# Patient Record
Sex: MALE | URBAN - METROPOLITAN AREA
[De-identification: names, ages, dates, MRNs, and addresses within clinical notes are randomized per-mention and may not be internally consistent; named-entity substitution may affect disease eponyms.]

---

## 2023-03-30 ENCOUNTER — TELEPHONE (OUTPATIENT)
Age: 78
End: 2023-03-30

## 2023-03-30 NOTE — TELEPHONE ENCOUNTER
----- Message from Nabila Cesar sent at 3/30/2023  9:50 AM EDT -----  Subject: Appointment Request    Reason for Call: New Patient/New to Provider Appointment needed: New   Patient Request Appointment    QUESTIONS    Reason for appointment request? No appointments available during search     Additional Information for Provider? Pt would like to know if John Powell would be willing to accept him as a new pt. Referred by a pt of   hers; José Miguel Malone;  Would like a call back  ---------------------------------------------------------------------------  --------------  [id number]!  [phone]; OK to leave message on voicemail  ---------------------------------------------------------------------------  --------------  SCRIPT ANSWERS  MEL Screen: Rae Meyer

## 2023-05-01 ENCOUNTER — OFFICE VISIT (OUTPATIENT)
Age: 78
End: 2023-05-01
Payer: COMMERCIAL

## 2023-05-01 ENCOUNTER — HOSPITAL ENCOUNTER (OUTPATIENT)
Facility: HOSPITAL | Age: 78
Discharge: HOME | End: 2023-05-04

## 2023-05-01 VITALS
OXYGEN SATURATION: 95 % | HEART RATE: 68 BPM | WEIGHT: 151.6 LBS | HEIGHT: 67 IN | DIASTOLIC BLOOD PRESSURE: 75 MMHG | RESPIRATION RATE: 16 BRPM | BODY MASS INDEX: 23.79 KG/M2 | SYSTOLIC BLOOD PRESSURE: 107 MMHG | TEMPERATURE: 98.3 F

## 2023-05-01 DIAGNOSIS — K59.09 CHRONIC CONSTIPATION: ICD-10-CM

## 2023-05-01 DIAGNOSIS — Z76.89 ENCOUNTER TO ESTABLISH CARE: Primary | ICD-10-CM

## 2023-05-01 DIAGNOSIS — R20.2 NUMBNESS AND TINGLING OF BOTH UPPER EXTREMITIES: ICD-10-CM

## 2023-05-01 DIAGNOSIS — E78.00 HYPERCHOLESTEREMIA: ICD-10-CM

## 2023-05-01 DIAGNOSIS — Z87.891 FORMER SMOKER: ICD-10-CM

## 2023-05-01 DIAGNOSIS — R10.9 CHRONIC ABDOMINAL PAIN: ICD-10-CM

## 2023-05-01 DIAGNOSIS — K21.9 GASTROESOPHAGEAL REFLUX DISEASE WITHOUT ESOPHAGITIS: ICD-10-CM

## 2023-05-01 DIAGNOSIS — G89.29 CHRONIC ABDOMINAL PAIN: ICD-10-CM

## 2023-05-01 DIAGNOSIS — N52.9 ERECTILE DYSFUNCTION, UNSPECIFIED ERECTILE DYSFUNCTION TYPE: ICD-10-CM

## 2023-05-01 DIAGNOSIS — J30.89 ENVIRONMENTAL AND SEASONAL ALLERGIES: ICD-10-CM

## 2023-05-01 DIAGNOSIS — E11.40 TYPE 2 DIABETES MELLITUS WITH DIABETIC NEUROPATHY, WITHOUT LONG-TERM CURRENT USE OF INSULIN (HCC): ICD-10-CM

## 2023-05-01 DIAGNOSIS — H26.9 CATARACT OF LEFT EYE, UNSPECIFIED CATARACT TYPE: ICD-10-CM

## 2023-05-01 DIAGNOSIS — M50.30 DEGENERATIVE DISC DISEASE, CERVICAL: ICD-10-CM

## 2023-05-01 DIAGNOSIS — D17.0 LIPOMA OF HEAD: ICD-10-CM

## 2023-05-01 DIAGNOSIS — Z79.1 LONG TERM CURRENT USE OF NON-STEROIDAL ANTI-INFLAMMATORIES (NSAID): ICD-10-CM

## 2023-05-01 DIAGNOSIS — R20.0 NUMBNESS AND TINGLING OF BOTH UPPER EXTREMITIES: ICD-10-CM

## 2023-05-01 DIAGNOSIS — I10 PRIMARY HYPERTENSION: ICD-10-CM

## 2023-05-01 DIAGNOSIS — E87.0 HYPERNATREMIA: ICD-10-CM

## 2023-05-01 LAB — LABCORP SPECIMEN COLLECTION: NORMAL

## 2023-05-01 PROCEDURE — 3074F SYST BP LT 130 MM HG: CPT | Performed by: NURSE PRACTITIONER

## 2023-05-01 PROCEDURE — 3044F HG A1C LEVEL LT 7.0%: CPT | Performed by: NURSE PRACTITIONER

## 2023-05-01 PROCEDURE — 3078F DIAST BP <80 MM HG: CPT | Performed by: NURSE PRACTITIONER

## 2023-05-01 PROCEDURE — 1123F ACP DISCUSS/DSCN MKR DOCD: CPT | Performed by: NURSE PRACTITIONER

## 2023-05-01 PROCEDURE — 99001 SPECIMEN HANDLING PT-LAB: CPT

## 2023-05-01 RX ORDER — HYDROCHLOROTHIAZIDE 25 MG/1
25 TABLET ORAL
COMMUNITY
Start: 2023-04-18

## 2023-05-01 RX ORDER — LISINOPRIL 40 MG/1
1 TABLET ORAL DAILY
Qty: 30 TABLET | Refills: 11 | COMMUNITY
Start: 2022-08-01 | End: 2023-08-02

## 2023-05-01 RX ORDER — MELOXICAM 15 MG/1
TABLET ORAL
COMMUNITY
Start: 2022-12-04 | End: 2023-12-06

## 2023-05-01 RX ORDER — ASPIRIN 81 MG/1
1 TABLET ORAL DAILY
COMMUNITY
Start: 2023-02-02

## 2023-05-01 RX ORDER — CETIRIZINE HYDROCHLORIDE 10 MG/1
1 TABLET ORAL DAILY
COMMUNITY
Start: 2022-12-01

## 2023-05-01 RX ORDER — LATANOPROST 50 UG/ML
SOLUTION/ DROPS OPHTHALMIC
COMMUNITY
Start: 2023-04-18

## 2023-05-01 RX ORDER — FLUTICASONE PROPIONATE 50 MCG
1 SPRAY, SUSPENSION (ML) NASAL DAILY
COMMUNITY

## 2023-05-01 RX ORDER — AMLODIPINE BESYLATE 5 MG/1
TABLET ORAL
COMMUNITY
Start: 2022-11-16 | End: 2023-08-02

## 2023-05-01 RX ORDER — ATORVASTATIN CALCIUM 20 MG/1
20 TABLET, FILM COATED ORAL
COMMUNITY
End: 2023-05-01

## 2023-05-01 RX ORDER — ALBUTEROL SULFATE 90 UG/1
AEROSOL, METERED RESPIRATORY (INHALATION)
COMMUNITY
Start: 2023-01-18

## 2023-05-01 RX ORDER — OMEPRAZOLE 20 MG/1
20 CAPSULE, DELAYED RELEASE ORAL
Qty: 90 CAPSULE | Refills: 1 | Status: SHIPPED | OUTPATIENT
Start: 2023-05-01

## 2023-05-01 RX ORDER — SILDENAFIL 100 MG/1
100 TABLET, FILM COATED ORAL
COMMUNITY
Start: 2023-02-02

## 2023-05-01 RX ORDER — TIMOLOL MALEATE 5 MG/ML
1 SOLUTION/ DROPS OPHTHALMIC 2 TIMES DAILY
COMMUNITY

## 2023-05-01 RX ORDER — ROSUVASTATIN CALCIUM 20 MG/1
10 TABLET, COATED ORAL DAILY
COMMUNITY

## 2023-05-01 RX ORDER — PSEUDOEPHEDRINE HCL 30 MG
TABLET ORAL
COMMUNITY
Start: 2022-12-01 | End: 2023-12-04

## 2023-05-01 SDOH — ECONOMIC STABILITY: INCOME INSECURITY: HOW HARD IS IT FOR YOU TO PAY FOR THE VERY BASICS LIKE FOOD, HOUSING, MEDICAL CARE, AND HEATING?: NOT HARD AT ALL

## 2023-05-01 SDOH — ECONOMIC STABILITY: FOOD INSECURITY: WITHIN THE PAST 12 MONTHS, YOU WORRIED THAT YOUR FOOD WOULD RUN OUT BEFORE YOU GOT MONEY TO BUY MORE.: NEVER TRUE

## 2023-05-01 SDOH — ECONOMIC STABILITY: FOOD INSECURITY: WITHIN THE PAST 12 MONTHS, THE FOOD YOU BOUGHT JUST DIDN'T LAST AND YOU DIDN'T HAVE MONEY TO GET MORE.: NEVER TRUE

## 2023-05-01 SDOH — ECONOMIC STABILITY: HOUSING INSECURITY
IN THE LAST 12 MONTHS, WAS THERE A TIME WHEN YOU DID NOT HAVE A STEADY PLACE TO SLEEP OR SLEPT IN A SHELTER (INCLUDING NOW)?: NO

## 2023-05-01 ASSESSMENT — PATIENT HEALTH QUESTIONNAIRE - PHQ9
SUM OF ALL RESPONSES TO PHQ9 QUESTIONS 1 & 2: 0
SUM OF ALL RESPONSES TO PHQ QUESTIONS 1-9: 0
1. LITTLE INTEREST OR PLEASURE IN DOING THINGS: 0
SUM OF ALL RESPONSES TO PHQ QUESTIONS 1-9: 0
2. FEELING DOWN, DEPRESSED OR HOPELESS: 0

## 2023-05-01 NOTE — PROGRESS NOTES
[facility] [place] [address] [address] [facility], [address]  [phone] ZQREXZ/[phone] fax    5/1/2023    Ryan Murrell 1945 is a pleasant BLACK male. Todays concern/HPI:  Establish care. Disabled . All medications obtained via [facility]. He also sees primary care via [facility]. ABD pain. Tried Bridget seltzer, pepto dismal. Effective sometimes. Spicy foods may cause sx. Numb/tingling. Down both arms. Started about 1.5 yrs ago. Interm. Arthritis. Shoulder and knees. Taking Mobic 15mg x15 years. [facility] Former smoker. Uses albuterol approx 1x/m. Cataract Left eye.  [facility].       Review of Systems - Negative except above    Past Medical History:   Diagnosis Date    Cataract of left eye 5/2/2023    Chronic abdominal pain 5/2/2023    Chronic constipation 5/2/2023    Environmental and seasonal allergies 5/2/2023    Former smoker 5/2/2023    Gastroesophageal reflux disease without esophagitis 5/2/2023    Hypercholesteremia 5/2/2023    Lipoma of head 5/2/2023    Long term current use of non-steroidal anti-inflammatories (NSAID) 5/2/2023    Male erectile dysfunction, unspecified 5/2/2023    Numbness and tingling of both upper extremities 5/2/2023    Primary hypertension 5/2/2023    Type 2 diabetes mellitus with diabetic neuropathy, without long-term current use of insulin (HCC) 5/2/2023     Current Outpatient Medications   Medication Sig    latanoprost (XALATAN) 0.005 % ophthalmic solution INSTILL 1 DROP IN LEFT EYE AT BEDTIME    timolol (TIMOPTIC) 0.5 % ophthalmic solution 1 drop 2 times daily    fluticasone (FLONASE) 50 MCG/ACT nasal spray 1 spray daily    cetirizine (ZYRTEC) 10 MG tablet Take 1 tablet by mouth daily    aspirin 81 MG EC tablet Take 1 tablet by mouth daily    meloxicam (MOBIC) 15 MG tablet TAKE ONE TABLET BY MOUTH EVERY DAY -TAKE WITH FOOD    docusate (COLACE, DULCOLAX) 100 MG CAPS TAKE TWO CAPSULES BY MOUTH TWICE A DAY AS NEEDED FOR

## 2023-05-01 NOTE — PROGRESS NOTES
Jordan Vogel presents today for   Chief Complaint   Patient presents with    [facility]     New patient             1. \"Have you been to the ER, urgent care clinic since your last visit? Hospitalized since your last visit? \" no    2. \"Have you seen or consulted any other health care providers outside of the [address] [place] [address] since your last visit? \" no     3. For patients aged 39-70: Has the patient had a colonoscopy / FIT/ Cologuard? Yes - Care Gap present. Most recent result on file      If the patient is female:    4. For patients aged 41-77: Has the patient had a mammogram within the past 2 years? NA - based on age or sex      11. For patients aged 21-65: Has the patient had a pap smear?  NA - based on age or sex

## 2023-05-02 ENCOUNTER — TELEPHONE (OUTPATIENT)
Age: 78
End: 2023-05-02

## 2023-05-02 ENCOUNTER — HOSPITAL ENCOUNTER (OUTPATIENT)
Facility: HOSPITAL | Age: 78
Discharge: HOME | End: 2023-05-05
Payer: COMMERCIAL

## 2023-05-02 DIAGNOSIS — R20.0 NUMBNESS AND TINGLING OF BOTH UPPER EXTREMITIES: ICD-10-CM

## 2023-05-02 DIAGNOSIS — R20.2 NUMBNESS AND TINGLING OF BOTH UPPER EXTREMITIES: ICD-10-CM

## 2023-05-02 PROBLEM — Z76.89 ENCOUNTER TO ESTABLISH CARE: Status: ACTIVE | Noted: 2023-05-02

## 2023-05-02 PROBLEM — J30.89 ENVIRONMENTAL AND SEASONAL ALLERGIES: Status: ACTIVE | Noted: 2023-05-02

## 2023-05-02 PROBLEM — G89.29 CHRONIC ABDOMINAL PAIN: Status: ACTIVE | Noted: 2023-05-02

## 2023-05-02 PROBLEM — Z87.891 FORMER SMOKER: Status: ACTIVE | Noted: 2023-05-02

## 2023-05-02 PROBLEM — D17.0 LIPOMA OF HEAD: Status: ACTIVE | Noted: 2023-05-02

## 2023-05-02 PROBLEM — R10.9 CHRONIC ABDOMINAL PAIN: Status: ACTIVE | Noted: 2023-05-02

## 2023-05-02 PROBLEM — H25.9 AGE-RELATED CATARACT OF LEFT EYE: Status: ACTIVE | Noted: 2023-05-02

## 2023-05-02 PROBLEM — K21.9 GASTROESOPHAGEAL REFLUX DISEASE WITHOUT ESOPHAGITIS: Status: ACTIVE | Noted: 2023-05-02

## 2023-05-02 PROBLEM — K59.09 CHRONIC CONSTIPATION: Status: ACTIVE | Noted: 2023-05-02

## 2023-05-02 PROBLEM — Z79.1 LONG TERM CURRENT USE OF NON-STEROIDAL ANTI-INFLAMMATORIES (NSAID): Status: ACTIVE | Noted: 2023-05-02

## 2023-05-02 PROBLEM — E78.00 HYPERCHOLESTEREMIA: Status: ACTIVE | Noted: 2023-05-02

## 2023-05-02 PROBLEM — H26.9 CATARACT OF LEFT EYE: Status: ACTIVE | Noted: 2023-05-02

## 2023-05-02 PROBLEM — E11.40 TYPE 2 DIABETES MELLITUS WITH DIABETIC NEUROPATHY, WITHOUT LONG-TERM CURRENT USE OF INSULIN (HCC): Status: ACTIVE | Noted: 2023-05-02

## 2023-05-02 PROBLEM — N52.9 MALE ERECTILE DYSFUNCTION, UNSPECIFIED: Status: ACTIVE | Noted: 2023-05-02

## 2023-05-02 PROBLEM — E87.0 HYPERNATREMIA: Status: ACTIVE | Noted: 2023-05-02

## 2023-05-02 PROBLEM — I10 PRIMARY HYPERTENSION: Status: ACTIVE | Noted: 2023-05-02

## 2023-05-02 LAB
ALBUMIN SERPL-MCNC: 4.9 G/DL (ref 3.7–4.7)
ALBUMIN/CREAT UR: 24 MG/G CREAT (ref 0–29)
ALBUMIN/GLOB SERPL: 2.6 {RATIO} (ref 1.2–2.2)
ALP SERPL-CCNC: 40 IU/L (ref 44–121)
ALT SERPL-CCNC: 16 IU/L (ref 0–44)
AST SERPL-CCNC: 22 IU/L (ref 0–40)
BILIRUB SERPL-MCNC: 0.9 MG/DL (ref 0–1.2)
BUN SERPL-MCNC: 16 MG/DL (ref 8–27)
BUN/CREAT SERPL: 13 (ref 10–24)
CALCIUM SERPL-MCNC: 10.2 MG/DL (ref 8.6–10.2)
CHLORIDE SERPL-SCNC: 104 MMOL/L (ref 96–106)
CHOLEST SERPL-MCNC: 132 MG/DL (ref 100–199)
CO2 SERPL-SCNC: 22 MMOL/L (ref 20–29)
CREAT SERPL-MCNC: 1.25 MG/DL (ref 0.76–1.27)
CREAT UR-MCNC: 80.6 MG/DL
EGFRCR SERPLBLD CKD-EPI 2021: 60 ML/MIN/1.73
GLOBULIN SER CALC-MCNC: 1.9 G/DL (ref 1.5–4.5)
GLUCOSE SERPL-MCNC: 101 MG/DL (ref 70–99)
HBA1C MFR BLD: 6.2 % (ref 4.8–5.6)
HDLC SERPL-MCNC: 47 MG/DL
LDLC SERPL CALC-MCNC: 68 MG/DL (ref 0–99)
MICROALBUMIN UR-MCNC: 19.6 UG/ML
POTASSIUM SERPL-SCNC: 4.5 MMOL/L (ref 3.5–5.2)
PROT SERPL-MCNC: 6.8 G/DL (ref 6–8.5)
SODIUM SERPL-SCNC: 145 MMOL/L (ref 134–144)
SPECIMEN STATUS REPORT: NORMAL
TRIGL SERPL-MCNC: 88 MG/DL (ref 0–149)
VLDLC SERPL CALC-MCNC: 17 MG/DL (ref 5–40)

## 2023-05-02 PROCEDURE — 72050 X-RAY EXAM NECK SPINE 4/5VWS: CPT

## 2023-05-02 NOTE — TELEPHONE ENCOUNTER
[facility] called and stated that in order for the patient to receive  omeprazole (PRILOSEC) 20 MG delayed release capsule they need progess notes to be faxed to their office.  Please advise    Fax # [phone]

## 2023-05-02 NOTE — ASSESSMENT & PLAN NOTE
Uncontrolled, changes made today: initiate PPI therapy and lifestyle modifications recommended   Avoid gut irritants such as spicy foods  Avoid laying down for 30 to 45 minutes after eating  Avoid excessive alcohol consumption

## 2023-05-02 NOTE — ASSESSMENT & PLAN NOTE
Well-controlled, continue current medications   [facility]  Limit sodium in diet to 2g/d  Initiate cardio exercise  Weight reduction  Increase water intake  Check BP and report if 3 consecutive readings greater than 139/89 to office

## 2023-05-02 NOTE — ASSESSMENT & PLAN NOTE
Uncontrolled, lifestyle modifications recommended   Obtain cerv xray    DDx:  Arthritis  Pinched nerve

## 2023-05-02 NOTE — ASSESSMENT & PLAN NOTE
Disabled . All medications obtained via [place]. He also sees primary care via [place].

## 2023-05-03 PROBLEM — M50.30 DEGENERATIVE DISC DISEASE, CERVICAL: Status: ACTIVE | Noted: 2023-05-03

## 2023-05-03 NOTE — ASSESSMENT & PLAN NOTE
Well-controlled, continue current medications   A1c 6.2  Refills obtained via [facility]

## 2023-05-03 NOTE — ASSESSMENT & PLAN NOTE
Well-controlled, continue current medications   Avoid allergens (strong smells, animals, cigarettes or carpeted areas)

## 2023-05-03 NOTE — ASSESSMENT & PLAN NOTE
Monitored by specialist- no acute findings meriting change in the plan   Cont gtts as prescribed by specialist

## 2023-06-26 ENCOUNTER — HOSPITAL ENCOUNTER (OUTPATIENT)
Facility: HOSPITAL | Age: 78
Discharge: HOME | End: 2023-06-29
Payer: MEDICARE

## 2023-06-26 ENCOUNTER — OFFICE VISIT (OUTPATIENT)
Age: 78
End: 2023-06-26
Payer: MEDICARE

## 2023-06-26 VITALS
RESPIRATION RATE: 16 BRPM | DIASTOLIC BLOOD PRESSURE: 58 MMHG | BODY MASS INDEX: 23.54 KG/M2 | TEMPERATURE: 97.8 F | HEART RATE: 56 BPM | WEIGHT: 150 LBS | HEIGHT: 67 IN | SYSTOLIC BLOOD PRESSURE: 104 MMHG

## 2023-06-26 DIAGNOSIS — M25.50 ARTHRALGIA, UNSPECIFIED JOINT: ICD-10-CM

## 2023-06-26 DIAGNOSIS — M47.812 CERVICAL FACET JOINT SYNDROME: ICD-10-CM

## 2023-06-26 DIAGNOSIS — M50.30 DDD (DEGENERATIVE DISC DISEASE), CERVICAL: Primary | ICD-10-CM

## 2023-06-26 DIAGNOSIS — M25.542 ARTHRALGIA OF BOTH HANDS: ICD-10-CM

## 2023-06-26 DIAGNOSIS — M25.541 ARTHRALGIA OF BOTH HANDS: ICD-10-CM

## 2023-06-26 DIAGNOSIS — R20.0 ARM NUMBNESS: ICD-10-CM

## 2023-06-26 PROBLEM — F43.12 POST-TRAUMATIC STRESS DISORDER, CHRONIC: Status: ACTIVE | Noted: 2023-06-26

## 2023-06-26 PROBLEM — M53.82: Status: ACTIVE | Noted: 2023-06-26

## 2023-06-26 PROBLEM — R60.0 LOCALIZED EDEMA: Status: ACTIVE | Noted: 2023-06-26

## 2023-06-26 PROBLEM — H40.1224: Status: ACTIVE | Noted: 2023-06-26

## 2023-06-26 PROBLEM — H40.9 GLAUCOMA: Status: ACTIVE | Noted: 2023-06-26

## 2023-06-26 PROBLEM — R10.30 GROIN PAIN: Status: ACTIVE | Noted: 2023-06-26

## 2023-06-26 PROBLEM — Z77.29 EXPOSURE TO POTENTIALLY HAZARDOUS SUBSTANCE: Status: ACTIVE | Noted: 2023-06-26

## 2023-06-26 PROBLEM — E78.5 HYPERLIPIDEMIA, UNSPECIFIED: Status: ACTIVE | Noted: 2023-06-26

## 2023-06-26 PROBLEM — L03.90 CELLULITIS, UNSPECIFIED: Status: ACTIVE | Noted: 2023-06-26

## 2023-06-26 PROBLEM — E11.9 DIABETES MELLITUS (HCC): Status: ACTIVE | Noted: 2023-06-26

## 2023-06-26 PROBLEM — Z76.0 REPEAT PRESCRIPTION ISSUE: Status: ACTIVE | Noted: 2023-06-26

## 2023-06-26 PROBLEM — M19.90 ARTHRITIS: Status: ACTIVE | Noted: 2023-06-26

## 2023-06-26 PROBLEM — I10 BENIGN HYPERTENSION: Status: ACTIVE | Noted: 2023-06-26

## 2023-06-26 PROBLEM — H52.4 PRESBYOPIA: Status: ACTIVE | Noted: 2023-06-26

## 2023-06-26 PROBLEM — H91.93 BILATERAL HEARING LOSS: Status: ACTIVE | Noted: 2023-06-26

## 2023-06-26 PROBLEM — E78.5 HYPERLIPIDEMIA: Status: ACTIVE | Noted: 2023-06-26

## 2023-06-26 PROBLEM — K08.139: Status: ACTIVE | Noted: 2023-06-26

## 2023-06-26 PROBLEM — K08.109 COMPLETE LOSS OF TEETH, UNSPECIFIED CAUSE, UNSPECIFIED CLASS: Status: ACTIVE | Noted: 2023-06-26

## 2023-06-26 PROBLEM — M79.642 PAIN IN LEFT HAND: Status: ACTIVE | Noted: 2023-06-26

## 2023-06-26 PROBLEM — M25.9 DISORDER OF SHOULDER: Status: ACTIVE | Noted: 2018-01-01

## 2023-06-26 PROBLEM — M79.642 PAIN OF LEFT HAND: Status: ACTIVE | Noted: 2023-06-26

## 2023-06-26 PROBLEM — H90.3 SENSORINEURAL HEARING LOSS, BILATERAL: Status: ACTIVE | Noted: 2023-06-26

## 2023-06-26 PROBLEM — D17.9 LIPOMA: Status: ACTIVE | Noted: 2023-06-26

## 2023-06-26 PROBLEM — E11.8 TYPE 2 DIABETES MELLITUS WITH UNSPECIFIED COMPLICATIONS (HCC): Status: ACTIVE | Noted: 2023-06-26

## 2023-06-26 PROBLEM — H40.1120: Status: ACTIVE | Noted: 2023-06-26

## 2023-06-26 PROBLEM — Z77.29 CONTACT WITH AND (SUSPECTED) EXPOSURE TO OTHER HAZARDOUS SUBSTANCES: Status: ACTIVE | Noted: 2023-06-26

## 2023-06-26 PROBLEM — G47.33 OBSTRUCTIVE SLEEP APNEA (ADULT) (PEDIATRIC): Status: ACTIVE | Noted: 2023-06-26

## 2023-06-26 PROBLEM — F43.10 POSTTRAUMATIC STRESS DISORDER: Status: ACTIVE | Noted: 2023-06-26

## 2023-06-26 PROBLEM — L30.9 ECZEMA: Status: ACTIVE | Noted: 2023-06-26

## 2023-06-26 PROBLEM — K59.00 CONSTIPATION: Status: ACTIVE | Noted: 2023-06-26

## 2023-06-26 PROBLEM — M25.559 HIP PAIN: Status: ACTIVE | Noted: 2023-06-26

## 2023-06-26 PROBLEM — I10 ESSENTIAL (PRIMARY) HYPERTENSION: Status: ACTIVE | Noted: 2023-06-26

## 2023-06-26 PROBLEM — J32.9 CHRONIC SINUSITIS: Status: ACTIVE | Noted: 2023-06-26

## 2023-06-26 PROBLEM — E11.9 TYPE 2 DIABETES MELLITUS WITHOUT COMPLICATIONS (HCC): Status: ACTIVE | Noted: 2023-06-26

## 2023-06-26 PROBLEM — M79.673 FOOT PAIN: Status: ACTIVE | Noted: 2023-06-26

## 2023-06-26 PROBLEM — D72.819 LEUKOPENIA: Status: ACTIVE | Noted: 2023-06-26

## 2023-06-26 LAB
CRP SERPL-MCNC: <0.3 MG/DL (ref 0–0.3)
ERYTHROCYTE [SEDIMENTATION RATE] IN BLOOD: 6 MM/HR (ref 0–20)
RHEUMATOID FACT SERPL-ACNC: <10 IU/ML

## 2023-06-26 PROCEDURE — 85652 RBC SED RATE AUTOMATED: CPT

## 2023-06-26 PROCEDURE — G8420 CALC BMI NORM PARAMETERS: HCPCS | Performed by: PHYSICAL MEDICINE & REHABILITATION

## 2023-06-26 PROCEDURE — 99204 OFFICE O/P NEW MOD 45 MIN: CPT | Performed by: PHYSICAL MEDICINE & REHABILITATION

## 2023-06-26 PROCEDURE — 36415 COLL VENOUS BLD VENIPUNCTURE: CPT

## 2023-06-26 PROCEDURE — 86431 RHEUMATOID FACTOR QUANT: CPT

## 2023-06-26 PROCEDURE — 86200 CCP ANTIBODY: CPT

## 2023-06-26 PROCEDURE — 1123F ACP DISCUSS/DSCN MKR DOCD: CPT | Performed by: PHYSICAL MEDICINE & REHABILITATION

## 2023-06-26 PROCEDURE — 3074F SYST BP LT 130 MM HG: CPT | Performed by: PHYSICAL MEDICINE & REHABILITATION

## 2023-06-26 PROCEDURE — 86140 C-REACTIVE PROTEIN: CPT

## 2023-06-26 PROCEDURE — 3078F DIAST BP <80 MM HG: CPT | Performed by: PHYSICAL MEDICINE & REHABILITATION

## 2023-06-26 PROCEDURE — 1036F TOBACCO NON-USER: CPT | Performed by: PHYSICAL MEDICINE & REHABILITATION

## 2023-06-26 PROCEDURE — G8427 DOCREV CUR MEDS BY ELIG CLIN: HCPCS | Performed by: PHYSICAL MEDICINE & REHABILITATION

## 2023-06-26 PROCEDURE — 86038 ANTINUCLEAR ANTIBODIES: CPT

## 2023-06-26 RX ORDER — METHYLPREDNISOLONE 4 MG/1
TABLET ORAL
Qty: 1 KIT | Refills: 0 | Status: SHIPPED | OUTPATIENT
Start: 2023-06-26 | End: 2023-07-02

## 2023-06-28 LAB
ANA SER QL: NEGATIVE
CCP IGA+IGG SERPL IA-ACNC: 18 UNITS (ref 0–19)

## 2023-07-03 DIAGNOSIS — M47.812 CERVICAL FACET JOINT SYNDROME: Primary | ICD-10-CM

## 2023-07-03 RX ORDER — METHYLPREDNISOLONE 4 MG/1
TABLET ORAL
Qty: 1 KIT | Refills: 0 | Status: SHIPPED | OUTPATIENT
Start: 2023-07-03 | End: 2023-07-09

## 2023-07-12 ENCOUNTER — HOSPITAL ENCOUNTER (OUTPATIENT)
Facility: HOSPITAL | Age: 78
Discharge: HOME | End: 2023-07-15
Attending: PHYSICAL MEDICINE & REHABILITATION
Payer: MEDICARE

## 2023-07-12 DIAGNOSIS — M25.542 ARTHRALGIA OF BOTH HANDS: ICD-10-CM

## 2023-07-12 DIAGNOSIS — M47.812 CERVICAL FACET JOINT SYNDROME: ICD-10-CM

## 2023-07-12 DIAGNOSIS — M50.30 DDD (DEGENERATIVE DISC DISEASE), CERVICAL: ICD-10-CM

## 2023-07-12 DIAGNOSIS — R20.0 ARM NUMBNESS: ICD-10-CM

## 2023-07-12 DIAGNOSIS — M25.541 ARTHRALGIA OF BOTH HANDS: ICD-10-CM

## 2023-07-12 PROCEDURE — 72141 MRI NECK SPINE W/O DYE: CPT

## 2023-08-28 ENCOUNTER — OFFICE VISIT (OUTPATIENT)
Age: 78
End: 2023-08-28
Payer: MEDICARE

## 2023-08-28 VITALS — WEIGHT: 155 LBS | OXYGEN SATURATION: 96 % | TEMPERATURE: 97.1 F | HEIGHT: 67 IN | BODY MASS INDEX: 24.33 KG/M2

## 2023-08-28 DIAGNOSIS — M47.812 CERVICAL FACET JOINT SYNDROME: ICD-10-CM

## 2023-08-28 DIAGNOSIS — M54.12 CERVICAL RADICULITIS: Primary | ICD-10-CM

## 2023-08-28 DIAGNOSIS — M48.02 CERVICAL SPINAL STENOSIS: ICD-10-CM

## 2023-08-28 PROCEDURE — 1123F ACP DISCUSS/DSCN MKR DOCD: CPT | Performed by: PHYSICAL MEDICINE & REHABILITATION

## 2023-08-28 PROCEDURE — G8427 DOCREV CUR MEDS BY ELIG CLIN: HCPCS | Performed by: PHYSICAL MEDICINE & REHABILITATION

## 2023-08-28 PROCEDURE — 99214 OFFICE O/P EST MOD 30 MIN: CPT | Performed by: PHYSICAL MEDICINE & REHABILITATION

## 2023-08-28 PROCEDURE — G8420 CALC BMI NORM PARAMETERS: HCPCS | Performed by: PHYSICAL MEDICINE & REHABILITATION

## 2023-08-28 PROCEDURE — 1036F TOBACCO NON-USER: CPT | Performed by: PHYSICAL MEDICINE & REHABILITATION

## 2023-08-28 NOTE — PROGRESS NOTES
Bandar Herrera presents today for   Chief Complaint   Patient presents with    Neck Pain       Is someone accompanying this pt? Yes, wife    Is the patient using any DME equipment during OV? no    Coordination of Care:  1. Have you been to the ER, urgent care clinic since your last visit? no  Hospitalized since your last visit? no    2. Have you seen or consulted any other health care providers outside of the [address] [place] [address] since your last visit? Yes, Eye Doctor Include any pap smears or colon screening.  no
degenerative disc disease and osteoarthritic changes greatest at the  C5-C6 and C6-C7 levels. No acute abnormalities. Written by Lonnie Myers, as dictated by Bambi Person MD.  I, Dr. Bambi Person confirm that all documentation is accurate.

## 2023-08-29 RX ORDER — METHYLPREDNISOLONE 4 MG/1
TABLET ORAL
Qty: 1 KIT | Refills: 0 | Status: SHIPPED | OUTPATIENT
Start: 2023-08-29 | End: 2023-09-04

## 2023-11-07 ENCOUNTER — PROCEDURE VISIT (OUTPATIENT)
Age: 78
End: 2023-11-07
Payer: MEDICARE

## 2023-11-07 VITALS
OXYGEN SATURATION: 96 % | BODY MASS INDEX: 23.17 KG/M2 | HEIGHT: 67 IN | HEART RATE: 79 BPM | TEMPERATURE: 97.1 F | DIASTOLIC BLOOD PRESSURE: 77 MMHG | WEIGHT: 147.6 LBS | SYSTOLIC BLOOD PRESSURE: 131 MMHG

## 2023-11-07 DIAGNOSIS — R20.0 ARM NUMBNESS: Primary | ICD-10-CM

## 2023-11-07 PROCEDURE — 95911 NRV CNDJ TEST 9-10 STUDIES: CPT | Performed by: PHYSICAL MEDICINE & REHABILITATION

## 2023-11-07 PROCEDURE — 95886 MUSC TEST DONE W/N TEST COMP: CPT | Performed by: PHYSICAL MEDICINE & REHABILITATION

## 2023-11-07 NOTE — PROGRESS NOTES
for the following:   Puja Torrse, have reviewed the History, Physical and updated the Allergic reactions for Brendal M [address] performed immediately prior to start of procedure:   I, Peter Cervantes, have performed the following reviews on [address] [place] [address] prior to the start of the procedure:            * Patient was identified by name and date of birth   * Agreement on procedure being performed was verified  * Risks and Benefits explained to the patient  * Procedure site verified and marked as necessary  * Patient was positioned for comfort  * Consent was signed and verified     Time: 1:41 PM     Date of procedure: 11/7/2023    Procedure performed by:  Lenny Tavera MD    Provider accompanied by: Leticiaibbraden. Patient accompanied by: Family member.     How tolerated by patient: tolerated the procedure well with no complications    Post Procedural Pain Scale: 0 - No Hurt    Comments: none    Written by Judyann Sandifer as dictated by Peter Cervantes MD

## 2023-11-13 ENCOUNTER — OFFICE VISIT (OUTPATIENT)
Age: 78
End: 2023-11-13
Payer: MEDICARE

## 2023-11-13 VITALS — BODY MASS INDEX: 23.12 KG/M2 | HEART RATE: 78 BPM | TEMPERATURE: 97.7 F | OXYGEN SATURATION: 96 % | HEIGHT: 67 IN

## 2023-11-13 DIAGNOSIS — R29.2 HYPER REFLEXIA: ICD-10-CM

## 2023-11-13 DIAGNOSIS — M48.02 CERVICAL SPINAL STENOSIS: ICD-10-CM

## 2023-11-13 DIAGNOSIS — M54.12 CERVICAL RADICULITIS: Primary | ICD-10-CM

## 2023-11-13 DIAGNOSIS — M25.542 ARTHRALGIA OF BOTH HANDS: ICD-10-CM

## 2023-11-13 DIAGNOSIS — M47.812 CERVICAL FACET JOINT SYNDROME: ICD-10-CM

## 2023-11-13 DIAGNOSIS — M25.541 ARTHRALGIA OF BOTH HANDS: ICD-10-CM

## 2023-11-13 DIAGNOSIS — R53.1 WEAKNESS: ICD-10-CM

## 2023-11-13 PROCEDURE — 1036F TOBACCO NON-USER: CPT | Performed by: PHYSICAL MEDICINE & REHABILITATION

## 2023-11-13 PROCEDURE — G8484 FLU IMMUNIZE NO ADMIN: HCPCS | Performed by: PHYSICAL MEDICINE & REHABILITATION

## 2023-11-13 PROCEDURE — 1123F ACP DISCUSS/DSCN MKR DOCD: CPT | Performed by: PHYSICAL MEDICINE & REHABILITATION

## 2023-11-13 PROCEDURE — 99214 OFFICE O/P EST MOD 30 MIN: CPT | Performed by: PHYSICAL MEDICINE & REHABILITATION

## 2023-11-13 PROCEDURE — G8427 DOCREV CUR MEDS BY ELIG CLIN: HCPCS | Performed by: PHYSICAL MEDICINE & REHABILITATION

## 2023-11-13 PROCEDURE — G8420 CALC BMI NORM PARAMETERS: HCPCS | Performed by: PHYSICAL MEDICINE & REHABILITATION

## 2023-11-13 ASSESSMENT — PATIENT HEALTH QUESTIONNAIRE - PHQ9
1. LITTLE INTEREST OR PLEASURE IN DOING THINGS: 1
SUM OF ALL RESPONSES TO PHQ QUESTIONS 1-9: 2
2. FEELING DOWN, DEPRESSED OR HOPELESS: 1
SUM OF ALL RESPONSES TO PHQ9 QUESTIONS 1 & 2: 2
SUM OF ALL RESPONSES TO PHQ QUESTIONS 1-9: 2

## 2023-11-13 NOTE — PROGRESS NOTES
[facility]  [facility] [address] [place] [address], [address] [facility], [address] [place] [address] Dr  Phone: [phone]  Fax: [phone]    Pt's YOB: 1945    ASSESSMENT   Mayuri Solorzano was seen today for shoulder pain and arm pain. Diagnoses and all orders for this visit:    Cervical radiculitis  -     Ervin Lundborg, MD, Spine Surgery, [place]    Cervical spinal stenosis  -     Ervin Lundborg, MD, Spine Surgery, [place]    Cervical facet joint syndrome  -     Ervin Lundborg, MD, Spine Surgery, Zoie Chase MD, Spine Surgery, [place]    Arthralgia of both hands    Weakness         IMPRESSION AND PLAN:  Fay Harvey is a 70 y.o. right hand dominant male with history of cervical pain and presents to the office today for diagnostic follow up. Pt continues to complain of cervical pain and an intermittent tingling sensation radiating into both arms into his finger tips; unchanged since his last office visit. Pt desires to be referred to Dr. Ervin Lundborg, MD for a surgical evaluation. He does not desire to take a lot of medication and is only taking pain pills when absolutely necessary. 1) Pt was referred to Dr. Ervin Lundborg, MD for a surgical evaluation; pt reports balance issues and is increasing dropping things - especially soap. 2) Mr. Pretty Polanco has a reminder for a \"due or due soon\" health maintenance. I have asked that he contact his primary care provider, Meli Hyde DNP, for follow-up on this health maintenance. 3)  demonstrated consistency with prescribing. 4) Pt defers any new medication. Return in about 2 months (around 1/13/2024) for Medication follow up. HISTORY OF PRESENT ILLNESS:  Fay Harvey is a 70 y.o. right hand dominant male with history of cervical pain and presents to the office today for diagnostic follow up.  Pt continues to complain of cervical pain and an intermittent tingling sensation radiating into both arms into his

## 2023-11-13 NOTE — PROGRESS NOTES
Phi Ahuja presents today for   Chief Complaint   Patient presents with    Shoulder Pain     bilateral    Arm Pain     bilateral       Is someone accompanying this pt? no    Is the patient using any DME equipment during OV? no      Coordination of Care:  1. Have you been to the ER, urgent care clinic since your last visit? no  Hospitalized since your last visit? no    2. Have you seen or consulted any other health care providers outside of the [address] [place] [address] Avenue since your last visit? Yes, [facility], podiatry Include any pap smears or colon screening.  no

## 2024-04-23 ENCOUNTER — TELEPHONE (OUTPATIENT)
Age: 79
End: 2024-04-23

## 2024-04-23 DIAGNOSIS — E78.00 HYPERCHOLESTEREMIA: ICD-10-CM

## 2024-04-23 DIAGNOSIS — I10 PRIMARY HYPERTENSION: Primary | ICD-10-CM

## 2024-04-23 DIAGNOSIS — Z12.5 SPECIAL SCREENING FOR MALIGNANT NEOPLASM OF PROSTATE: ICD-10-CM

## 2024-04-23 DIAGNOSIS — E11.40 TYPE 2 DIABETES MELLITUS WITH DIABETIC NEUROPATHY, WITHOUT LONG-TERM CURRENT USE OF INSULIN (HCC): ICD-10-CM

## 2024-04-24 NOTE — TELEPHONE ENCOUNTER
Diagnosis Orders   1. Primary hypertension  Comprehensive Metabolic Panel      2. Special screening for malignant neoplasm of prostate  PSA Screening      3. Type 2 diabetes mellitus with diabetic neuropathy, without long-term current use of insulin (HCC)  Hemoglobin A1C    Microalbumin / Creatinine Urine Ratio      4. Hypercholesteremia  Lipid Panel        AW 5/2/2024

## 2024-04-25 ENCOUNTER — HOSPITAL ENCOUNTER (OUTPATIENT)
Facility: HOSPITAL | Age: 79
Discharge: HOME | End: 2024-04-28

## 2024-04-25 LAB — SENTARA SPECIMEN COLLECTION: NORMAL

## 2024-04-25 PROCEDURE — 99001 SPECIMEN HANDLING PT-LAB: CPT

## 2024-04-26 LAB
A/G RATIO: 2.3 RATIO (ref 1.1–2.6)
ALBUMIN: 4.1 G/DL (ref 3.5–5)
ALP BLD-CCNC: 40 U/L (ref 40–125)
ALT SERPL-CCNC: 9 U/L (ref 5–40)
ANION GAP SERPL CALCULATED.3IONS-SCNC: 12 MMOL/L (ref 3–15)
AST SERPL-CCNC: 16 U/L (ref 10–37)
BILIRUB SERPL-MCNC: 0.7 MG/DL (ref 0.2–1.2)
BUN BLDV-MCNC: 11 MG/DL (ref 6–22)
CALCIUM SERPL-MCNC: 9.2 MG/DL (ref 8.4–10.5)
CHLORIDE BLD-SCNC: 105 MMOL/L (ref 98–110)
CHOLESTEROL/HDL RATIO: 4.9 (ref 0–5)
CHOLESTEROL: 207 MG/DL (ref 110–200)
CO2: 24 MMOL/L (ref 20–32)
CREAT SERPL-MCNC: 1.1 MG/DL (ref 0.8–1.6)
CREATININE URINE: 90 MG/DL
ESTIMATED AVERAGE GLUCOSE: 137 MG/DL (ref 91–123)
GLOBULIN: 1.8 G/DL (ref 2–4)
GLOMERULAR FILTRATION RATE: >60 ML/MIN/1.73 SQ.M.
GLUCOSE: 100 MG/DL (ref 70–99)
HBA1C MFR BLD: 6.4 % (ref 4.8–5.6)
HDLC SERPL-MCNC: 42 MG/DL
LDL CHOLESTEROL CALCULATED: 146 MG/DL (ref 50–99)
LDL/HDL RATIO: 3.5
MICROALB/CREAT RATIO (UG/MG CREAT.): 30.4 (ref 0–30)
MICROALBUMIN/CREAT 24H UR: 27.4 MG/L (ref 0.1–17)
NON-HDL CHOLESTEROL: 165 MG/DL
POTASSIUM SERPL-SCNC: 4 MMOL/L (ref 3.5–5.5)
PROSTATE SPECIFIC ANTIGEN: 0.81 NG/ML
SODIUM BLD-SCNC: 141 MMOL/L (ref 133–145)
TOTAL PROTEIN: 5.9 G/DL (ref 6.2–8.1)
TRIGL SERPL-MCNC: 92 MG/DL (ref 40–149)
VLDLC SERPL CALC-MCNC: 18 MG/DL (ref 8–30)

## 2024-05-02 ENCOUNTER — OFFICE VISIT (OUTPATIENT)
Age: 79
End: 2024-05-02
Payer: MEDICARE

## 2024-05-02 VITALS
SYSTOLIC BLOOD PRESSURE: 130 MMHG | HEART RATE: 78 BPM | DIASTOLIC BLOOD PRESSURE: 82 MMHG | TEMPERATURE: 97.9 F | HEIGHT: 67 IN | BODY MASS INDEX: 24.17 KG/M2 | OXYGEN SATURATION: 98 % | WEIGHT: 154 LBS

## 2024-05-02 DIAGNOSIS — I10 PRIMARY HYPERTENSION: ICD-10-CM

## 2024-05-02 DIAGNOSIS — H90.3 SENSORINEURAL HEARING LOSS, BILATERAL: ICD-10-CM

## 2024-05-02 DIAGNOSIS — M50.30 DEGENERATIVE DISC DISEASE, CERVICAL: ICD-10-CM

## 2024-05-02 DIAGNOSIS — H40.1120 PRIMARY OPEN ANGLE GLAUCOMA OF LEFT EYE, UNSPECIFIED GLAUCOMA STAGE: ICD-10-CM

## 2024-05-02 DIAGNOSIS — Z00.00 MEDICARE ANNUAL WELLNESS VISIT, SUBSEQUENT: Primary | ICD-10-CM

## 2024-05-02 DIAGNOSIS — E78.2 MIXED HYPERLIPIDEMIA: ICD-10-CM

## 2024-05-02 DIAGNOSIS — E11.21 TYPE 2 DIABETES MELLITUS WITH DIABETIC NEPHROPATHY, WITHOUT LONG-TERM CURRENT USE OF INSULIN (HCC): ICD-10-CM

## 2024-05-02 DIAGNOSIS — Z23 NEED FOR PROPHYLACTIC VACCINATION AND INOCULATION AGAINST VARICELLA: ICD-10-CM

## 2024-05-02 DIAGNOSIS — Z79.1 LONG TERM CURRENT USE OF NON-STEROIDAL ANTI-INFLAMMATORIES (NSAID): ICD-10-CM

## 2024-05-02 DIAGNOSIS — J30.89 ENVIRONMENTAL AND SEASONAL ALLERGIES: ICD-10-CM

## 2024-05-02 DIAGNOSIS — K21.9 GASTROESOPHAGEAL REFLUX DISEASE WITHOUT ESOPHAGITIS: ICD-10-CM

## 2024-05-02 DIAGNOSIS — E77.8 HYPOPROTEINEMIA (HCC): ICD-10-CM

## 2024-05-02 PROBLEM — E78.5 HYPERLIPIDEMIA, UNSPECIFIED: Status: RESOLVED | Noted: 2023-06-26 | Resolved: 2024-05-02

## 2024-05-02 PROBLEM — L03.90 CELLULITIS, UNSPECIFIED: Status: RESOLVED | Noted: 2023-06-26 | Resolved: 2024-05-02

## 2024-05-02 PROBLEM — R10.9 CHRONIC ABDOMINAL PAIN: Status: RESOLVED | Noted: 2023-05-02 | Resolved: 2024-05-02

## 2024-05-02 PROBLEM — M79.642 PAIN IN LEFT HAND: Status: RESOLVED | Noted: 2023-06-26 | Resolved: 2024-05-02

## 2024-05-02 PROBLEM — D72.819 LEUKOPENIA: Status: RESOLVED | Noted: 2023-06-26 | Resolved: 2024-05-02

## 2024-05-02 PROBLEM — M53.82: Status: RESOLVED | Noted: 2023-06-26 | Resolved: 2024-05-02

## 2024-05-02 PROBLEM — M79.673 FOOT PAIN: Status: RESOLVED | Noted: 2023-06-26 | Resolved: 2024-05-02

## 2024-05-02 PROBLEM — E11.9 DIABETES MELLITUS (HCC): Status: RESOLVED | Noted: 2023-06-26 | Resolved: 2024-05-02

## 2024-05-02 PROBLEM — K08.109 COMPLETE LOSS OF TEETH, UNSPECIFIED CAUSE, UNSPECIFIED CLASS: Status: RESOLVED | Noted: 2023-06-26 | Resolved: 2024-05-02

## 2024-05-02 PROBLEM — H40.9 GLAUCOMA: Status: RESOLVED | Noted: 2023-06-26 | Resolved: 2024-05-02

## 2024-05-02 PROBLEM — Z76.89 ENCOUNTER TO ESTABLISH CARE: Status: RESOLVED | Noted: 2023-05-02 | Resolved: 2024-05-02

## 2024-05-02 PROBLEM — M79.642 PAIN OF LEFT HAND: Status: RESOLVED | Noted: 2023-06-26 | Resolved: 2024-05-02

## 2024-05-02 PROBLEM — Z76.0 REPEAT PRESCRIPTION ISSUE: Status: RESOLVED | Noted: 2023-06-26 | Resolved: 2024-05-02

## 2024-05-02 PROBLEM — F43.10 POSTTRAUMATIC STRESS DISORDER: Status: RESOLVED | Noted: 2023-06-26 | Resolved: 2024-05-02

## 2024-05-02 PROBLEM — R60.0 LOCALIZED EDEMA: Status: RESOLVED | Noted: 2023-06-26 | Resolved: 2024-05-02

## 2024-05-02 PROBLEM — E78.00 HYPERCHOLESTEREMIA: Status: RESOLVED | Noted: 2023-05-02 | Resolved: 2024-05-02

## 2024-05-02 PROBLEM — Z77.29 EXPOSURE TO POTENTIALLY HAZARDOUS SUBSTANCE: Status: RESOLVED | Noted: 2023-06-26 | Resolved: 2024-05-02

## 2024-05-02 PROBLEM — J32.9 CHRONIC SINUSITIS: Status: RESOLVED | Noted: 2023-06-26 | Resolved: 2024-05-02

## 2024-05-02 PROBLEM — D17.9 LIPOMA: Status: RESOLVED | Noted: 2023-06-26 | Resolved: 2024-05-02

## 2024-05-02 PROBLEM — H91.93 BILATERAL HEARING LOSS: Status: RESOLVED | Noted: 2023-06-26 | Resolved: 2024-05-02

## 2024-05-02 PROBLEM — M25.559 HIP PAIN: Status: RESOLVED | Noted: 2023-06-26 | Resolved: 2024-05-02

## 2024-05-02 PROBLEM — M19.90 ARTHRITIS: Status: RESOLVED | Noted: 2023-06-26 | Resolved: 2024-05-02

## 2024-05-02 PROBLEM — E11.8 TYPE 2 DIABETES MELLITUS WITH UNSPECIFIED COMPLICATIONS (HCC): Status: RESOLVED | Noted: 2023-06-26 | Resolved: 2024-05-02

## 2024-05-02 PROBLEM — G89.29 CHRONIC ABDOMINAL PAIN: Status: RESOLVED | Noted: 2023-05-02 | Resolved: 2024-05-02

## 2024-05-02 PROBLEM — Z77.29 CONTACT WITH AND (SUSPECTED) EXPOSURE TO OTHER HAZARDOUS SUBSTANCES: Status: RESOLVED | Noted: 2023-06-26 | Resolved: 2024-05-02

## 2024-05-02 PROBLEM — R10.30 GROIN PAIN: Status: RESOLVED | Noted: 2023-06-26 | Resolved: 2024-05-02

## 2024-05-02 PROBLEM — H40.1224: Status: RESOLVED | Noted: 2023-06-26 | Resolved: 2024-05-02

## 2024-05-02 PROBLEM — K59.00 CONSTIPATION: Status: RESOLVED | Noted: 2023-06-26 | Resolved: 2024-05-02

## 2024-05-02 PROBLEM — E11.40 TYPE 2 DIABETES MELLITUS WITH DIABETIC NEUROPATHY, WITHOUT LONG-TERM CURRENT USE OF INSULIN (HCC): Status: RESOLVED | Noted: 2023-05-02 | Resolved: 2024-05-02

## 2024-05-02 PROBLEM — M25.9 DISORDER OF SHOULDER: Status: RESOLVED | Noted: 2018-01-01 | Resolved: 2024-05-02

## 2024-05-02 PROBLEM — E87.0 HYPERNATREMIA: Status: RESOLVED | Noted: 2023-05-02 | Resolved: 2024-05-02

## 2024-05-02 PROBLEM — K08.139: Status: RESOLVED | Noted: 2023-06-26 | Resolved: 2024-05-02

## 2024-05-02 PROBLEM — E11.9 TYPE 2 DIABETES MELLITUS WITHOUT COMPLICATIONS (HCC): Status: RESOLVED | Noted: 2023-06-26 | Resolved: 2024-05-02

## 2024-05-02 PROCEDURE — G0439 PPPS, SUBSEQ VISIT: HCPCS | Performed by: NURSE PRACTITIONER

## 2024-05-02 PROCEDURE — 3044F HG A1C LEVEL LT 7.0%: CPT | Performed by: NURSE PRACTITIONER

## 2024-05-02 PROCEDURE — 99214 OFFICE O/P EST MOD 30 MIN: CPT | Performed by: NURSE PRACTITIONER

## 2024-05-02 PROCEDURE — 3075F SYST BP GE 130 - 139MM HG: CPT | Performed by: NURSE PRACTITIONER

## 2024-05-02 PROCEDURE — 1123F ACP DISCUSS/DSCN MKR DOCD: CPT | Performed by: NURSE PRACTITIONER

## 2024-05-02 PROCEDURE — 3079F DIAST BP 80-89 MM HG: CPT | Performed by: NURSE PRACTITIONER

## 2024-05-02 RX ORDER — ROSUVASTATIN CALCIUM 20 MG/1
20 TABLET, COATED ORAL DAILY
Qty: 95 TABLET | Refills: 1 | Status: SHIPPED | OUTPATIENT
Start: 2024-05-02

## 2024-05-02 RX ORDER — OMEPRAZOLE 20 MG/1
20 CAPSULE, DELAYED RELEASE ORAL DAILY PRN
Qty: 95 CAPSULE | Refills: 3 | Status: SHIPPED | OUTPATIENT
Start: 2024-05-02

## 2024-05-02 SDOH — ECONOMIC STABILITY: FOOD INSECURITY: WITHIN THE PAST 12 MONTHS, THE FOOD YOU BOUGHT JUST DIDN'T LAST AND YOU DIDN'T HAVE MONEY TO GET MORE.: NEVER TRUE

## 2024-05-02 SDOH — ECONOMIC STABILITY: INCOME INSECURITY: HOW HARD IS IT FOR YOU TO PAY FOR THE VERY BASICS LIKE FOOD, HOUSING, MEDICAL CARE, AND HEATING?: NOT HARD AT ALL

## 2024-05-02 SDOH — ECONOMIC STABILITY: FOOD INSECURITY: WITHIN THE PAST 12 MONTHS, YOU WORRIED THAT YOUR FOOD WOULD RUN OUT BEFORE YOU GOT MONEY TO BUY MORE.: NEVER TRUE

## 2024-05-02 ASSESSMENT — PATIENT HEALTH QUESTIONNAIRE - PHQ9
SUM OF ALL RESPONSES TO PHQ QUESTIONS 1-9: 0
SUM OF ALL RESPONSES TO PHQ QUESTIONS 1-9: 0
1. LITTLE INTEREST OR PLEASURE IN DOING THINGS: NOT AT ALL
SUM OF ALL RESPONSES TO PHQ9 QUESTIONS 1 & 2: 0
SUM OF ALL RESPONSES TO PHQ QUESTIONS 1-9: 0
2. FEELING DOWN, DEPRESSED OR HOPELESS: NOT AT ALL
SUM OF ALL RESPONSES TO PHQ QUESTIONS 1-9: 0

## 2024-05-02 ASSESSMENT — LIFESTYLE VARIABLES
HOW OFTEN DO YOU HAVE A DRINK CONTAINING ALCOHOL: NEVER
HOW MANY STANDARD DRINKS CONTAINING ALCOHOL DO YOU HAVE ON A TYPICAL DAY: PATIENT DOES NOT DRINK

## 2024-05-02 NOTE — ASSESSMENT & PLAN NOTE
Well-controlled, continue current medications   Avoid allergens (strong smells, animals, cigarettes or carpeted areas)  Avoid going outside when the pollen is heavy  Continue albuterol, Zyrtec, Flonase as prescribed by [facility]  provider

## 2024-05-02 NOTE — ASSESSMENT & PLAN NOTE
7/2023 MRI cerv  IMPRESSION:  1.  Moderate central spinal canal stenosis at C3-4.  2.  Mild central spinal canal stenoses at C4-5 and C6-7 with mild to moderate  stenosis at C5-6.  3.  Multilevel neural foraminal stenoses several of which are severe, full  details provided above.    11/2023 Referred to Dr Grey for surgical eval by NEVIN Queen

## 2024-05-02 NOTE — ASSESSMENT & PLAN NOTE
Have cautioned patient on the use of NSAID therapy.  Strongly rec only taking NSAID as needed  Eat prior to taking nsaid to help reduce GI irritation    Long term use could lead to elevated BP, ankle edema, blood-tinged vomiting, blood in stool or allergic reaction. If these symptoms occur, patient is to stop the medication immediately and contact the office.

## 2024-05-02 NOTE — PROGRESS NOTES
Katlyn Henry presents today for   Chief Complaint   Patient presents with    Medicare AWV                 1. \"Have you been to the ER, urgent care clinic since your last visit?  Hospitalized since your last visit?\" no    2. \"Have you seen or consulted any other health care providers outside of the [facility] since your last visit?\" no     3. For patients aged 45-75: Has the patient had a colonoscopy / FIT/ Cologuard? No      If the patient is female:    4. For patients aged 40-74: Has the patient had a mammogram within the past 2 years? NA - based on age or sex      5. For patients aged 21-65: Has the patient had a pap smear? NA - based on age or sex    
Medicare Annual Wellness Visit    Katlyn Henry is here for Medicare AWV    Assessment & Plan   Medicare annual wellness visit, subsequent  Assessment & Plan:  [facility] Wellness: Reviewed all HM and ordered tests/imaging appropriately. Reviewed care teams and medications with updates.     Reviewed and updated Healthcare Decision maker    The patient is considered a full code. The patient was provided with an advanced directive form, which he will complete and return at his convenience.    Laboratory Studies  A1c is 6.4. Microalbumin is slightly elevated at 27.4. Triglycerides are 92. LDL cholesterol is 146. PSA is 0.813. Protein is 5.9.    Health Maintenance DUE:  [facility]   PSA: 0.813  Eye exam: every 3-4 m  LDCT: n/a    Specialists:  [facility] specialists    Orders:  -     zoster recombinant adjuvanted vaccine (SHINGRIX) 50 MCG/0.5ML SUSR injection; Inject 0.5 mLs into the muscle once for 1 dose, Disp-0.5 mL, R-0Print  Need for prophylactic vaccination and inoculation against varicella  -     zoster recombinant adjuvanted vaccine (SHINGRIX) 50 MCG/0.5ML SUSR injection; Inject 0.5 mLs into the muscle once for 1 dose, Disp-0.5 mL, R-0Print  Type 2 diabetes mellitus with diabetic nephropathy, without long-term current use of insulin (HCC)  Assessment & Plan:  [facility] has been [facility]  A1c 6.2-6.4  Microalbumin 27.4  Increase metformin from 1000 mg daily to twice daily  Patient has requested that I take over care for diabetes  Follow-up 6 months with A1c  Orders:  -     metFORMIN (GLUCOPHAGE) 1000 MG tablet; Take 1 tablet by mouth 2 times daily (with meals), Disp-180 tablet, R-1Normal  -     Hemoglobin A1C; Future  Primary hypertension  Assessment & Plan:   Well-controlled, continue current medications   [facility] under [facility]  Limit sodium in diet to 2g/d  Initiate cardio exercise  Weight reduction  Increase water intake  Check BP and report if 3 consecutive readings greater than 139/89 to office  Orders:  -     
Environmental and seasonal allergies 05/02/2023    Former smoker 05/02/2023    Gastroesophageal reflux disease without esophagitis 05/02/2023    Hypercholesteremia 05/02/2023    Hyperlipidemia 06/26/2023    Hypoproteinemia (HCC) 05/02/2024    Lipoma of head 05/02/2023    Long term current use of non-steroidal anti-inflammatories (NSAID) 05/02/2023    Male erectile dysfunction, unspecified 05/02/2023    Numbness and tingling of both upper extremities 05/02/2023    Obstructive sleep apnea (adult) (pediatric) 06/26/2023    Primary hypertension 05/02/2023    Primary open-angle glaucoma, left eye, stage unspecified 06/26/2023    Sensorineural hearing loss, bilateral 06/26/2023    Type 2 diabetes mellitus with diabetic neuropathy, without long-term current use of insulin (HCC) 05/02/2023     Current Outpatient Medications   Medication Sig    zoster recombinant adjuvanted vaccine (SHINGRIX) 50 MCG/0.5ML SUSR injection Inject 0.5 mLs into the muscle once for 1 dose    omeprazole (PRILOSEC) 20 MG delayed release capsule Take 1 capsule by mouth daily as needed (reflux)    metFORMIN (GLUCOPHAGE) 1000 MG tablet Take 1 tablet by mouth 2 times daily (with meals)    rosuvastatin (CRESTOR) 20 MG tablet Take 1 tablet by mouth daily For cholesterol    latanoprost (XALATAN) 0.005 % ophthalmic solution INSTILL 1 DROP IN LEFT EYE AT BEDTIME    timolol (TIMOPTIC) 0.5 % ophthalmic solution 1 drop 2 times daily    fluticasone (FLONASE) 50 MCG/ACT nasal spray 1 spray daily    cetirizine (ZYRTEC) 10 MG tablet Take 1 tablet by mouth daily    aspirin 81 MG EC tablet Take 1 tablet by mouth daily    meloxicam (MOBIC) 15 MG tablet TAKE ONE TABLET BY MOUTH EVERY DAY -TAKE WITH FOOD    albuterol sulfate HFA (PROVENTIL;VENTOLIN;PROAIR) 108 (90 Base) MCG/ACT inhaler INHALE 2 PUFFS BY INHALATION FOUR TIMES A DAY AS NEEDED FOR SHORTNESS OF BREATH    lisinopril (PRINIVIL;ZESTRIL) 40 MG tablet Take 1 tablet by mouth daily    sildenafil (VIAGRA) 100 MG

## 2024-05-02 NOTE — ACP (ADVANCE CARE PLANNING)
Advance Care Planning     Advance Care Planning (ACP) Physician/NP/PA Conversation    Date of Conversation: 5/2/2024  Conducted with: Patient with Decision Making Capacity    Healthcare Decision Maker:      Primary Decision Maker: Milady Wyatt - Spouse - [phone]    Click here to complete Healthcare Decision Makers including selection of the Healthcare Decision Maker Relationship (ie \"Primary\")  Today we documented Decision Maker(s) consistent with Legal Next of Kin hierarchy.    Care Preferences:    Hospitalization:  \"If your health worsens and it becomes clear that your chance of recovery is unlikely, what would be your preference regarding hospitalization?\"  The patient would prefer comfort-focused treatment without hospitalization.    Ventilation:  \"If you were unable to breath on your own and your chance of recovery was unlikely, what would be your preference about the use of a ventilator (breathing machine) if it was available to you?\"  The patient would desire the use of a ventilator.    Resuscitation:  \"In the event your heart stopped as a result of an underlying serious health condition, would you want attempts made to restart your heart, or would you prefer a natural death?\"  Yes, attempt to resuscitate.    ventilation preferences, hospitalization preferences, and resuscitation preferences    Conversation Outcomes / Follow-Up Plan:  ACP in process - completing/providing documents  Reviewed DNR/DNI and patient elects Full Code (Attempt Resuscitation)    Length of Voluntary ACP Conversation in minutes:  16 minutes    Huong Palomino DNP

## 2024-05-02 NOTE — ASSESSMENT & PLAN NOTE
Monitored by specialist- no acute findings meriting change in the plan  Continue eyedrops as prescribed

## 2024-05-02 NOTE — ASSESSMENT & PLAN NOTE
TG 92; LDL   Increase rosuvastatin from 10 mg (one half tab of 20 mg) to 20 mg daily  Recheck level 6 months  Patient has requested that I take over managing his cholesterol    Reduce fried fatty or oily foods in diet  Limit red meats, processed foods, and alcohol.    Limit fast food  Increase physical activity to 60 minutes of moderate intensity aerobic exercise per week.  Increase water intake  Reduce alcohol intake    How to raise HDL if low:  Consume oats, beans, legumes, olive oil, whole grains, nuts, seeds, fatty fish, and berries.  Lose weight/fat  Reduce alcohol consumption  Smoking cessation

## 2024-05-02 NOTE — ASSESSMENT & PLAN NOTE
Continue omeprazole as needed  Avoid gut irritants such as spicy foods  Avoid laying down for 30 to 45 minutes after eating  Avoid excessive alcohol consumption

## 2024-05-02 NOTE — ASSESSMENT & PLAN NOTE
[facility] Wellness: Reviewed all HM and ordered tests/imaging appropriately. Reviewed care teams and medications with updates.     Reviewed and updated Healthcare Decision maker    The patient is considered a full code. The patient was provided with an advanced directive form, which he will complete and return at his convenience.    Laboratory Studies  A1c is 6.4. Microalbumin is slightly elevated at 27.4. Triglycerides are 92. LDL cholesterol is 146. PSA is 0.813. Protein is 5.9.    Health Maintenance DUE:  [facility]   PSA: 0.813  Eye exam: every 3-4 m  LDCT: n/a    Specialists:  [facility] specialists

## 2024-05-02 NOTE — ASSESSMENT & PLAN NOTE
[facility]  A1c 6.2-6.4  Microalbumin 27.4  Increase metformin from 1000 mg daily to twice daily  Patient has requested that I take over care for diabetes  Follow-up 6 months with A1c

## 2024-05-02 NOTE — ASSESSMENT & PLAN NOTE
Monitored by specialist- no acute findings meriting change in the plan  [facility] specialist  Bilateral hearing aids

## 2024-06-01 PROBLEM — Z00.00 MEDICARE ANNUAL WELLNESS VISIT, SUBSEQUENT: Status: RESOLVED | Noted: 2024-05-02 | Resolved: 2024-06-01

## 2024-10-01 DIAGNOSIS — E11.21 TYPE 2 DIABETES MELLITUS WITH DIABETIC NEPHROPATHY, WITHOUT LONG-TERM CURRENT USE OF INSULIN (HCC): ICD-10-CM

## 2024-10-01 DIAGNOSIS — I10 PRIMARY HYPERTENSION: ICD-10-CM

## 2024-10-01 DIAGNOSIS — E78.2 MIXED HYPERLIPIDEMIA: ICD-10-CM

## 2024-11-04 ENCOUNTER — TELEPHONE (OUTPATIENT)
Facility: CLINIC | Age: 79
End: 2024-11-04

## 2024-11-04 ENCOUNTER — LAB (OUTPATIENT)
Facility: CLINIC | Age: 79
End: 2024-11-04

## 2024-11-04 DIAGNOSIS — E11.21 TYPE II DIABETES MELLITUS WITH NEPHROPATHY (HCC): ICD-10-CM

## 2024-11-04 DIAGNOSIS — I10 PRIMARY HYPERTENSION: ICD-10-CM

## 2024-11-04 DIAGNOSIS — E78.2 MIXED HYPERLIPIDEMIA: Primary | ICD-10-CM

## 2024-11-04 DIAGNOSIS — E78.2 MIXED HYPERLIPIDEMIA: ICD-10-CM

## 2024-11-04 NOTE — TELEPHONE ENCOUNTER
Lab orders for upcoming appt.    Future Appointments   Date Time Provider Department Center   11/4/2024 10:00 AM [facility]   11/6/2024 10:30 AM [facility]

## 2024-11-13 ENCOUNTER — TELEPHONE (OUTPATIENT)
Facility: CLINIC | Age: 79
End: 2024-11-13

## 2024-11-13 DIAGNOSIS — E11.21 TYPE 2 DIABETES MELLITUS WITH DIABETIC NEPHROPATHY, WITHOUT LONG-TERM CURRENT USE OF INSULIN (HCC): ICD-10-CM

## 2024-11-13 DIAGNOSIS — E77.8 HYPOPROTEINEMIA (HCC): ICD-10-CM

## 2024-11-13 DIAGNOSIS — G47.33 OBSTRUCTIVE SLEEP APNEA (ADULT) (PEDIATRIC): ICD-10-CM

## 2024-11-13 DIAGNOSIS — E78.2 MIXED HYPERLIPIDEMIA: ICD-10-CM

## 2024-11-13 DIAGNOSIS — Z79.1 LONG TERM (CURRENT) USE OF NON-STEROIDAL ANTI-INFLAMMATORIES (NSAID): ICD-10-CM

## 2024-11-13 DIAGNOSIS — I10 PRIMARY HYPERTENSION: Primary | ICD-10-CM

## 2024-11-13 NOTE — TELEPHONE ENCOUNTER
Pt had a lab appt on 11/4/2024. Lab orders were released and marked as \"active collected 11/4/2024 @ 9:29 am\", however pt notified the  that he was not fasting so the labs were not collected.     New lab orders are needed.

## 2024-11-19 ENCOUNTER — LAB (OUTPATIENT)
Facility: CLINIC | Age: 79
End: 2024-11-19

## 2024-11-19 ENCOUNTER — HOSPITAL ENCOUNTER (OUTPATIENT)
Facility: HOSPITAL | Age: 79
Setting detail: SPECIMEN
Discharge: HOME | End: 2024-11-22

## 2024-11-19 DIAGNOSIS — Z79.1 LONG TERM (CURRENT) USE OF NON-STEROIDAL ANTI-INFLAMMATORIES (NSAID): ICD-10-CM

## 2024-11-19 DIAGNOSIS — E77.8 HYPOPROTEINEMIA (HCC): ICD-10-CM

## 2024-11-19 DIAGNOSIS — E11.21 TYPE 2 DIABETES MELLITUS WITH DIABETIC NEPHROPATHY, WITHOUT LONG-TERM CURRENT USE OF INSULIN (HCC): ICD-10-CM

## 2024-11-19 DIAGNOSIS — I10 PRIMARY HYPERTENSION: ICD-10-CM

## 2024-11-19 DIAGNOSIS — G47.33 OBSTRUCTIVE SLEEP APNEA (ADULT) (PEDIATRIC): ICD-10-CM

## 2024-11-19 DIAGNOSIS — E78.2 MIXED HYPERLIPIDEMIA: ICD-10-CM

## 2024-11-19 LAB — SENTARA SPECIMEN COLLECTION: NORMAL

## 2024-11-19 PROCEDURE — 99001 SPECIMEN HANDLING PT-LAB: CPT

## 2024-11-20 LAB
A/G RATIO: 2.6 RATIO (ref 1.1–2.6)
ALBUMIN: 4.6 G/DL (ref 3.5–5)
ALP BLD-CCNC: 41 U/L (ref 40–125)
ALT SERPL-CCNC: 16 U/L (ref 5–40)
ANION GAP SERPL CALCULATED.3IONS-SCNC: 14 MMOL/L (ref 3–15)
AST SERPL-CCNC: 21 U/L (ref 10–37)
BILIRUB SERPL-MCNC: 1 MG/DL (ref 0.2–1.2)
BUN BLDV-MCNC: 10 MG/DL (ref 6–22)
CALCIUM SERPL-MCNC: 9.2 MG/DL (ref 8.4–10.5)
CHLORIDE BLD-SCNC: 105 MMOL/L (ref 98–110)
CHOLESTEROL, TOTAL: 134 MG/DL (ref 110–200)
CHOLESTEROL/HDL RATIO: 3 (ref 0–5)
CO2: 21 MMOL/L (ref 20–32)
CREAT SERPL-MCNC: 1.2 MG/DL (ref 0.8–1.6)
ESTIMATED AVERAGE GLUCOSE: 159 MG/DL (ref 91–123)
GFR, ESTIMATED: 59.9 ML/MIN/1.73 SQ.M.
GLOBULIN: 1.8 G/DL (ref 2–4)
GLUCOSE: 109 MG/DL (ref 70–99)
HBA1C MFR BLD: 7.2 % (ref 4.8–5.6)
HDLC SERPL-MCNC: 45 MG/DL
LDL CHOLESTEROL: 75 MG/DL (ref 50–99)
LDL/HDL RATIO: 1.7
NON-HDL CHOLESTEROL: 89 MG/DL
POTASSIUM SERPL-SCNC: 4 MMOL/L (ref 3.5–5.5)
SODIUM BLD-SCNC: 140 MMOL/L (ref 133–145)
TOTAL PROTEIN: 6.4 G/DL (ref 6.2–8.1)
TRIGL SERPL-MCNC: 66 MG/DL (ref 40–149)
VLDLC SERPL CALC-MCNC: 13 MG/DL (ref 8–30)

## 2024-11-26 ENCOUNTER — OFFICE VISIT (OUTPATIENT)
Facility: CLINIC | Age: 79
End: 2024-11-26
Payer: MEDICARE

## 2024-11-26 VITALS
HEART RATE: 77 BPM | DIASTOLIC BLOOD PRESSURE: 74 MMHG | RESPIRATION RATE: 16 BRPM | TEMPERATURE: 97.8 F | OXYGEN SATURATION: 95 % | SYSTOLIC BLOOD PRESSURE: 128 MMHG | BODY MASS INDEX: 23.54 KG/M2 | WEIGHT: 150 LBS | HEIGHT: 67 IN

## 2024-11-26 DIAGNOSIS — E78.2 MIXED HYPERLIPIDEMIA: ICD-10-CM

## 2024-11-26 DIAGNOSIS — Z12.5 SPECIAL SCREENING FOR MALIGNANT NEOPLASM OF PROSTATE: ICD-10-CM

## 2024-11-26 DIAGNOSIS — K59.09 CHRONIC CONSTIPATION: ICD-10-CM

## 2024-11-26 DIAGNOSIS — R41.3 MEMORY DEFICIT: ICD-10-CM

## 2024-11-26 DIAGNOSIS — I10 PRIMARY HYPERTENSION: ICD-10-CM

## 2024-11-26 DIAGNOSIS — E11.21 TYPE 2 DIABETES MELLITUS WITH DIABETIC NEPHROPATHY, WITHOUT LONG-TERM CURRENT USE OF INSULIN (HCC): Primary | ICD-10-CM

## 2024-11-26 DIAGNOSIS — F99 ABNORMAL MINI-MENTAL STATUS EXAM: ICD-10-CM

## 2024-11-26 DIAGNOSIS — M70.52 BURSITIS OF LEFT KNEE, UNSPECIFIED BURSA: ICD-10-CM

## 2024-11-26 DIAGNOSIS — R10.84 GENERALIZED ABDOMINAL PAIN: ICD-10-CM

## 2024-11-26 PROCEDURE — 99214 OFFICE O/P EST MOD 30 MIN: CPT | Performed by: NURSE PRACTITIONER

## 2024-11-26 PROCEDURE — 3074F SYST BP LT 130 MM HG: CPT | Performed by: NURSE PRACTITIONER

## 2024-11-26 PROCEDURE — 3051F HG A1C>EQUAL 7.0%<8.0%: CPT | Performed by: NURSE PRACTITIONER

## 2024-11-26 PROCEDURE — 1126F AMNT PAIN NOTED NONE PRSNT: CPT | Performed by: NURSE PRACTITIONER

## 2024-11-26 PROCEDURE — 1123F ACP DISCUSS/DSCN MKR DOCD: CPT | Performed by: NURSE PRACTITIONER

## 2024-11-26 PROCEDURE — 3078F DIAST BP <80 MM HG: CPT | Performed by: NURSE PRACTITIONER

## 2024-11-26 RX ORDER — SITAGLIPTIN AND METFORMIN HYDROCHLORIDE 1000; 50 MG/1; MG/1
1 TABLET, FILM COATED ORAL 2 TIMES DAILY WITH MEALS
Qty: 180 TABLET | Refills: 1 | Status: SHIPPED | OUTPATIENT
Start: 2024-11-26

## 2024-11-26 RX ORDER — POLYETHYLENE GLYCOL 3350 17 G/17G
17 POWDER, FOR SOLUTION ORAL DAILY
COMMUNITY
Start: 2024-11-26

## 2024-11-26 RX ORDER — ROSUVASTATIN CALCIUM 20 MG/1
20 TABLET, COATED ORAL DAILY
Qty: 90 TABLET | Refills: 1 | Status: SHIPPED | OUTPATIENT
Start: 2024-11-26

## 2024-11-26 RX ORDER — ETODOLAC 400 MG/1
400 TABLET, FILM COATED ORAL 2 TIMES DAILY
COMMUNITY

## 2024-11-26 NOTE — PROGRESS NOTES
Katlyn Henry is a 79 y.o. male (: 1945) presenting to address:    Chief Complaint   Patient presents with    6 Month Follow-Up       Vitals:    24 0912   BP: 128/74   Pulse: 77   Resp: 16   Temp: 97.8 °F (36.6 °C)   SpO2: 95%       \"Have you been to the ER, urgent care clinic since your last visit?  Hospitalized since your last visit?\"    NO    “Have you seen or consulted any other health care providers outside of [facility] since your last visit?”    NO             
PCV-13, PREVNAR 13, (age 6w+), IM, 0.5mL 05/09/2017    Pneumococcal, PPSV23, PNEUMOVAX 23, (age 2y+), SC/IM, 0.5mL 01/31/2023    TDaP, ADACEL (age 10y-64y), BOOSTRIX (age 10y+), IM, 0.5mL 09/11/2014, 11/01/2014         Return in about 6 months (around 5/26/2025) for AWV, Lab fasting (CBC, CMP, Lipid, PSA, A1c, Microalb).      Dr. Huong Palomino, MORIAHP-C, DNP  [facility]     The patient (or guardian, if applicable) and other individuals in attendance with the patient were advised that Artificial Intelligence will be utilized during this visit to record and process the conversation to generate a clinical note. The patient (or guardian, if applicable) and other individuals in attendance at the appointment consented to the use of AI, including the recording.

## 2024-11-26 NOTE — ASSESSMENT & PLAN NOTE
Initiate MiraLAX daily as needed  Stop MiraLAX if diarrhea noted  Increase water and fiber in diet

## 2024-11-26 NOTE — ASSESSMENT & PLAN NOTE
TG 66; -75 (in May 2024-increased Crestor from 10 mg to 20 mg)  Cont crestor 20mg qd  Sonal 6m    5/2/2024 ov note:  TG 92; LDL   Increase rosuvastatin from 10 mg (one half tab of 20 mg) to 20 mg daily  Recheck level 6 months  Patient has requested that I take over managing his cholesterol

## 2024-11-26 NOTE — ASSESSMENT & PLAN NOTE
His abdominal discomfort could be due to gas, as excessive consumption can lead to gas, bloating, cramping, and constipation. He is advised to avoid peanuts and take MiraLAX, one capful daily as needed.    A KUB x-ray will be ordered to investigate his abdominal discomfort. If the KUB x-ray does not reveal any significant findings and he continues to experience discomfort, a CT scan will be considered.

## 2024-11-26 NOTE — ASSESSMENT & PLAN NOTE
Chronic, at goal (stable), continue current treatment plan   Well-controlled, continue current medications   [facility]    Continue amlodipine 5 mg daily  Continue HCTZ 25 mg daily  Continue lisinopril 40 mg daily

## 2024-11-26 NOTE — ASSESSMENT & PLAN NOTE
A1c 6.4-7.2  DC Metformin 1000mg BID  Initiate Janumet 50/1000mg bid  Sonal level 6m    According to patient the [facility] is no longer managing his diabetes.    5/2/2024 ov note:  [facility] has been [facility] DM  A1c 6.2-6.4  Microalbumin 27.4  Increase metformin from 1000 mg daily to twice daily  Patient has requested that I take over care for diabetes  Follow-up 6 months with A1c

## 2024-12-10 ENCOUNTER — APPOINTMENT (OUTPATIENT)
Facility: HOSPITAL | Age: 79
End: 2024-12-10
Payer: MEDICARE

## 2024-12-10 ENCOUNTER — HOSPITAL ENCOUNTER (OUTPATIENT)
Facility: HOSPITAL | Age: 79
Setting detail: OBSERVATION
Discharge: HOME | End: 2024-12-12
Attending: EMERGENCY MEDICINE | Admitting: FAMILY MEDICINE
Payer: MEDICARE

## 2024-12-10 ENCOUNTER — APPOINTMENT (OUTPATIENT)
Facility: HOSPITAL | Age: 79
End: 2024-12-10
Attending: INTERNAL MEDICINE
Payer: MEDICARE

## 2024-12-10 DIAGNOSIS — R94.31 ABNORMAL EKG: ICD-10-CM

## 2024-12-10 DIAGNOSIS — R06.02 SHORTNESS OF BREATH: ICD-10-CM

## 2024-12-10 DIAGNOSIS — R94.31 ABNORMAL ELECTROCARDIOGRAPHY: Primary | ICD-10-CM

## 2024-12-10 DIAGNOSIS — I25.10 CAD (CORONARY ARTERY DISEASE): ICD-10-CM

## 2024-12-10 DIAGNOSIS — R94.39 ABNORMAL NUCLEAR STRESS TEST: ICD-10-CM

## 2024-12-10 DIAGNOSIS — R53.83 OTHER FATIGUE: ICD-10-CM

## 2024-12-10 PROBLEM — N17.9 AKI (ACUTE KIDNEY INJURY) (HCC): Status: ACTIVE | Noted: 2024-12-10

## 2024-12-10 PROBLEM — R06.00 DYSPNEA: Status: ACTIVE | Noted: 2024-12-10

## 2024-12-10 LAB
ALBUMIN SERPL-MCNC: 3.3 G/DL (ref 3.4–5)
ALBUMIN/GLOB SERPL: 1.1 (ref 0.8–1.7)
ALP SERPL-CCNC: 41 U/L (ref 45–117)
ALT SERPL-CCNC: 26 U/L (ref 16–61)
AMPHET UR QL SCN: NEGATIVE
ANION GAP SERPL CALC-SCNC: 8 MMOL/L (ref 3–18)
APPEARANCE UR: CLEAR
AST SERPL-CCNC: 26 U/L (ref 10–38)
BARBITURATES UR QL SCN: NEGATIVE
BASOPHILS # BLD: 0 K/UL (ref 0–0.1)
BASOPHILS NFR BLD: 1 % (ref 0–2)
BENZODIAZ UR QL: NEGATIVE
BILIRUB SERPL-MCNC: 0.5 MG/DL (ref 0.2–1)
BILIRUB UR QL: NEGATIVE
BUN SERPL-MCNC: 20 MG/DL (ref 7–18)
BUN/CREAT SERPL: 11 (ref 12–20)
CALCIUM SERPL-MCNC: 8.7 MG/DL (ref 8.5–10.1)
CANNABINOIDS UR QL SCN: NEGATIVE
CHLORIDE SERPL-SCNC: 110 MMOL/L (ref 100–111)
CO2 SERPL-SCNC: 22 MMOL/L (ref 21–32)
COCAINE UR QL SCN: NEGATIVE
COLOR UR: YELLOW
CREAT SERPL-MCNC: 1.81 MG/DL (ref 0.6–1.3)
DIFFERENTIAL METHOD BLD: ABNORMAL
ECHO AO ASC DIAM: 2.9 CM
ECHO AO ASCENDING AORTA INDEX: 1.63 CM/M2
ECHO AO ROOT DIAM: 3.2 CM
ECHO AO ROOT INDEX: 1.8 CM/M2
ECHO AV AREA PEAK VELOCITY: 3 CM2
ECHO AV AREA VTI: 2.7 CM2
ECHO AV AREA/BSA PEAK VELOCITY: 1.7 CM2/M2
ECHO AV AREA/BSA VTI: 1.5 CM2/M2
ECHO AV MEAN GRADIENT: 2 MMHG
ECHO AV MEAN VELOCITY: 0.7 M/S
ECHO AV PEAK GRADIENT: 4 MMHG
ECHO AV PEAK VELOCITY: 1 M/S
ECHO AV VELOCITY RATIO: 1
ECHO AV VTI: 20.9 CM
ECHO BSA: 1.79 M2
ECHO LA VOL A-L A2C: 32 ML (ref 18–58)
ECHO LA VOL A-L A4C: 21 ML (ref 18–58)
ECHO LA VOL BP: 28 ML (ref 18–58)
ECHO LA VOL MOD A2C: 30 ML (ref 18–58)
ECHO LA VOL MOD A4C: 21 ML (ref 18–58)
ECHO LA VOL/BSA BIPLANE: 16 ML/M2 (ref 16–34)
ECHO LA VOLUME AREA LENGTH: 29 ML
ECHO LA VOLUME INDEX A-L A2C: 18 ML/M2 (ref 16–34)
ECHO LA VOLUME INDEX A-L A4C: 12 ML/M2 (ref 16–34)
ECHO LA VOLUME INDEX AREA LENGTH: 16 ML/M2 (ref 16–34)
ECHO LA VOLUME INDEX MOD A2C: 17 ML/M2 (ref 16–34)
ECHO LA VOLUME INDEX MOD A4C: 12 ML/M2 (ref 16–34)
ECHO LV E' LATERAL VELOCITY: 7.03 CM/S
ECHO LV E' SEPTAL VELOCITY: 3.7 CM/S
ECHO LV EDV A2C: 50 ML
ECHO LV EDV A4C: 64 ML
ECHO LV EDV BP: 57 ML (ref 67–155)
ECHO LV EDV INDEX A4C: 36 ML/M2
ECHO LV EDV INDEX BP: 32 ML/M2
ECHO LV EDV NDEX A2C: 28 ML/M2
ECHO LV EF PHYSICIAN: 55 %
ECHO LV EJECTION FRACTION A2C: 56 %
ECHO LV EJECTION FRACTION A4C: 60 %
ECHO LV EJECTION FRACTION BIPLANE: 57 % (ref 55–100)
ECHO LV ESV A2C: 22 ML
ECHO LV ESV A4C: 25 ML
ECHO LV ESV BP: 24 ML (ref 22–58)
ECHO LV ESV INDEX A2C: 12 ML/M2
ECHO LV ESV INDEX A4C: 14 ML/M2
ECHO LV ESV INDEX BP: 13 ML/M2
ECHO LV FRACTIONAL SHORTENING: 31 % (ref 28–44)
ECHO LV INTERNAL DIMENSION DIASTOLE INDEX: 2.19 CM/M2
ECHO LV INTERNAL DIMENSION DIASTOLIC: 3.9 CM (ref 4.2–5.9)
ECHO LV INTERNAL DIMENSION SYSTOLIC INDEX: 1.52 CM/M2
ECHO LV INTERNAL DIMENSION SYSTOLIC: 2.7 CM
ECHO LV IVSD: 1.3 CM (ref 0.6–1)
ECHO LV MASS 2D: 169.4 G (ref 88–224)
ECHO LV MASS INDEX 2D: 95.1 G/M2 (ref 49–115)
ECHO LV POSTERIOR WALL DIASTOLIC: 1.2 CM (ref 0.6–1)
ECHO LV RELATIVE WALL THICKNESS RATIO: 0.62
ECHO LVOT AREA: 3.1 CM2
ECHO LVOT AV VTI INDEX: 0.84
ECHO LVOT DIAM: 2 CM
ECHO LVOT MEAN GRADIENT: 2 MMHG
ECHO LVOT PEAK GRADIENT: 4 MMHG
ECHO LVOT PEAK VELOCITY: 1 M/S
ECHO LVOT STROKE VOLUME INDEX: 30.9 ML/M2
ECHO LVOT SV: 55 ML
ECHO LVOT VTI: 17.5 CM
ECHO MV A VELOCITY: 0.96 M/S
ECHO MV E DECELERATION TIME (DT): 295.1 MS
ECHO MV E VELOCITY: 0.69 M/S
ECHO MV E/A RATIO: 0.72
ECHO MV E/E' LATERAL: 9.82
ECHO MV E/E' RATIO (AVERAGED): 14.23
ECHO MV E/E' SEPTAL: 18.65
ECHO PV MAX VELOCITY: 0.7 M/S
ECHO PV PEAK GRADIENT: 2 MMHG
ECHO RA AREA 4C: 13.4 CM2
ECHO RV BASAL DIMENSION: 3.1 CM
ECHO RV FREE WALL PEAK S': 12.2 CM/S
ECHO RV TAPSE: 1.9 CM (ref 1.7–?)
EKG ATRIAL RATE: 76 BPM
EKG ATRIAL RATE: 81 BPM
EKG DIAGNOSIS: NORMAL
EKG DIAGNOSIS: NORMAL
EKG P AXIS: 72 DEGREES
EKG P AXIS: 74 DEGREES
EKG P-R INTERVAL: 156 MS
EKG P-R INTERVAL: 158 MS
EKG Q-T INTERVAL: 396 MS
EKG Q-T INTERVAL: 410 MS
EKG QRS DURATION: 108 MS
EKG QRS DURATION: 112 MS
EKG QTC CALCULATION (BAZETT): 445 MS
EKG QTC CALCULATION (BAZETT): 476 MS
EKG R AXIS: -26 DEGREES
EKG R AXIS: -45 DEGREES
EKG T AXIS: -44 DEGREES
EKG T AXIS: 3 DEGREES
EKG VENTRICULAR RATE: 76 BPM
EKG VENTRICULAR RATE: 81 BPM
EOSINOPHIL # BLD: 0 K/UL (ref 0–0.4)
EOSINOPHIL NFR BLD: 1 % (ref 0–5)
ERYTHROCYTE [DISTWIDTH] IN BLOOD BY AUTOMATED COUNT: 13.3 % (ref 11.6–14.5)
GLOBULIN SER CALC-MCNC: 2.9 G/DL (ref 2–4)
GLUCOSE BLD STRIP.AUTO-MCNC: 107 MG/DL (ref 70–110)
GLUCOSE BLD STRIP.AUTO-MCNC: 108 MG/DL (ref 70–110)
GLUCOSE SERPL-MCNC: 130 MG/DL (ref 74–99)
GLUCOSE UR STRIP.AUTO-MCNC: NEGATIVE MG/DL
HCT VFR BLD AUTO: 40 % (ref 36–48)
HGB BLD-MCNC: 13.6 G/DL (ref 13–16)
HGB UR QL STRIP: NEGATIVE
IMM GRANULOCYTES # BLD AUTO: 0 K/UL (ref 0–0.04)
IMM GRANULOCYTES NFR BLD AUTO: 1 % (ref 0–0.5)
KETONES UR QL STRIP.AUTO: NEGATIVE MG/DL
LEUKOCYTE ESTERASE UR QL STRIP.AUTO: NEGATIVE
LIPASE SERPL-CCNC: 35 U/L (ref 13–75)
LYMPHOCYTES # BLD: 1.3 K/UL (ref 0.9–3.6)
LYMPHOCYTES NFR BLD: 32 % (ref 21–52)
Lab: NORMAL
MAGNESIUM SERPL-MCNC: 2.1 MG/DL (ref 1.6–2.6)
MCH RBC QN AUTO: 28.5 PG (ref 24–34)
MCHC RBC AUTO-ENTMCNC: 34 G/DL (ref 31–37)
MCV RBC AUTO: 83.7 FL (ref 78–100)
METHADONE UR QL: NEGATIVE
MONOCYTES # BLD: 0.5 K/UL (ref 0.05–1.2)
MONOCYTES NFR BLD: 12 % (ref 3–10)
NEUTS SEG # BLD: 2.3 K/UL (ref 1.8–8)
NEUTS SEG NFR BLD: 55 % (ref 40–73)
NITRITE UR QL STRIP.AUTO: NEGATIVE
NRBC # BLD: 0 K/UL (ref 0–0.01)
NRBC BLD-RTO: 0 PER 100 WBC
NT PRO BNP: 94 PG/ML (ref 0–1800)
OPIATES UR QL: NEGATIVE
PCP UR QL: NEGATIVE
PH UR STRIP: 5 (ref 5–8)
PLATELET # BLD AUTO: 274 K/UL (ref 135–420)
PMV BLD AUTO: 9.6 FL (ref 9.2–11.8)
POTASSIUM SERPL-SCNC: 4.5 MMOL/L (ref 3.5–5.5)
PROT SERPL-MCNC: 6.2 G/DL (ref 6.4–8.2)
PROT UR STRIP-MCNC: NEGATIVE MG/DL
RBC # BLD AUTO: 4.78 M/UL (ref 4.35–5.65)
SODIUM SERPL-SCNC: 140 MMOL/L (ref 136–145)
SP GR UR REFRACTOMETRY: >1.03 (ref 1–1.03)
TROPONIN I SERPL HS-MCNC: 10 NG/L (ref 0–78)
TROPONIN I SERPL HS-MCNC: 10 NG/L (ref 0–78)
TSH SERPL DL<=0.05 MIU/L-ACNC: 1.97 UIU/ML (ref 0.36–3.74)
UROBILINOGEN UR QL STRIP.AUTO: 0.2 EU/DL (ref 0.2–1)
WBC # BLD AUTO: 4.2 K/UL (ref 4.6–13.2)

## 2024-12-10 PROCEDURE — 99223 1ST HOSP IP/OBS HIGH 75: CPT | Performed by: PHYSICIAN ASSISTANT

## 2024-12-10 PROCEDURE — 93306 TTE W/DOPPLER COMPLETE: CPT | Performed by: INTERNAL MEDICINE

## 2024-12-10 PROCEDURE — G0378 HOSPITAL OBSERVATION PER HR: HCPCS

## 2024-12-10 PROCEDURE — 84443 ASSAY THYROID STIM HORMONE: CPT

## 2024-12-10 PROCEDURE — 93010 ELECTROCARDIOGRAM REPORT: CPT | Performed by: INTERNAL MEDICINE

## 2024-12-10 PROCEDURE — 94761 N-INVAS EAR/PLS OXIMETRY MLT: CPT

## 2024-12-10 PROCEDURE — 84484 ASSAY OF TROPONIN QUANT: CPT

## 2024-12-10 PROCEDURE — 93306 TTE W/DOPPLER COMPLETE: CPT

## 2024-12-10 PROCEDURE — 80307 DRUG TEST PRSMV CHEM ANLYZR: CPT

## 2024-12-10 PROCEDURE — 85025 COMPLETE CBC W/AUTO DIFF WBC: CPT

## 2024-12-10 PROCEDURE — 71045 X-RAY EXAM CHEST 1 VIEW: CPT

## 2024-12-10 PROCEDURE — 83880 ASSAY OF NATRIURETIC PEPTIDE: CPT

## 2024-12-10 PROCEDURE — 6360000002 HC RX W HCPCS: Performed by: PHYSICIAN ASSISTANT

## 2024-12-10 PROCEDURE — 74177 CT ABD & PELVIS W/CONTRAST: CPT

## 2024-12-10 PROCEDURE — 6360000002 HC RX W HCPCS: Performed by: EMERGENCY MEDICINE

## 2024-12-10 PROCEDURE — 6370000000 HC RX 637 (ALT 250 FOR IP): Performed by: PHYSICIAN ASSISTANT

## 2024-12-10 PROCEDURE — 87086 URINE CULTURE/COLONY COUNT: CPT

## 2024-12-10 PROCEDURE — 96372 THER/PROPH/DIAG INJ SC/IM: CPT

## 2024-12-10 PROCEDURE — 99213 OFFICE O/P EST LOW 20 MIN: CPT | Performed by: INTERNAL MEDICINE

## 2024-12-10 PROCEDURE — 96374 THER/PROPH/DIAG INJ IV PUSH: CPT

## 2024-12-10 PROCEDURE — 6360000004 HC RX CONTRAST MEDICATION: Performed by: EMERGENCY MEDICINE

## 2024-12-10 PROCEDURE — 83690 ASSAY OF LIPASE: CPT

## 2024-12-10 PROCEDURE — 70450 CT HEAD/BRAIN W/O DYE: CPT

## 2024-12-10 PROCEDURE — 80053 COMPREHEN METABOLIC PANEL: CPT

## 2024-12-10 PROCEDURE — 2580000003 HC RX 258: Performed by: EMERGENCY MEDICINE

## 2024-12-10 PROCEDURE — 83735 ASSAY OF MAGNESIUM: CPT

## 2024-12-10 PROCEDURE — 99285 EMERGENCY DEPT VISIT HI MDM: CPT

## 2024-12-10 PROCEDURE — 82962 GLUCOSE BLOOD TEST: CPT

## 2024-12-10 PROCEDURE — 81003 URINALYSIS AUTO W/O SCOPE: CPT

## 2024-12-10 PROCEDURE — 2580000003 HC RX 258: Performed by: PHYSICIAN ASSISTANT

## 2024-12-10 PROCEDURE — 93005 ELECTROCARDIOGRAM TRACING: CPT | Performed by: EMERGENCY MEDICINE

## 2024-12-10 RX ORDER — SODIUM CHLORIDE 0.9 % (FLUSH) 0.9 %
5-40 SYRINGE (ML) INJECTION PRN
Status: DISCONTINUED | OUTPATIENT
Start: 2024-12-10 | End: 2024-12-12 | Stop reason: HOSPADM

## 2024-12-10 RX ORDER — ASPIRIN 81 MG/1
81 TABLET ORAL DAILY
Status: DISCONTINUED | OUTPATIENT
Start: 2024-12-11 | End: 2024-12-12 | Stop reason: HOSPADM

## 2024-12-10 RX ORDER — INSULIN LISPRO 100 [IU]/ML
0-4 INJECTION, SOLUTION INTRAVENOUS; SUBCUTANEOUS
Status: DISCONTINUED | OUTPATIENT
Start: 2024-12-10 | End: 2024-12-12 | Stop reason: HOSPADM

## 2024-12-10 RX ORDER — SODIUM CHLORIDE 9 MG/ML
INJECTION, SOLUTION INTRAVENOUS PRN
Status: DISCONTINUED | OUTPATIENT
Start: 2024-12-10 | End: 2024-12-12 | Stop reason: HOSPADM

## 2024-12-10 RX ORDER — POTASSIUM CHLORIDE 1500 MG/1
40 TABLET, EXTENDED RELEASE ORAL PRN
Status: DISCONTINUED | OUTPATIENT
Start: 2024-12-10 | End: 2024-12-12 | Stop reason: HOSPADM

## 2024-12-10 RX ORDER — ENOXAPARIN SODIUM 100 MG/ML
40 INJECTION SUBCUTANEOUS DAILY
Status: DISCONTINUED | OUTPATIENT
Start: 2024-12-10 | End: 2024-12-12 | Stop reason: HOSPADM

## 2024-12-10 RX ORDER — DEXTROSE MONOHYDRATE 100 MG/ML
INJECTION, SOLUTION INTRAVENOUS CONTINUOUS PRN
Status: DISCONTINUED | OUTPATIENT
Start: 2024-12-10 | End: 2024-12-12 | Stop reason: HOSPADM

## 2024-12-10 RX ORDER — LATANOPROST 50 UG/ML
1 SOLUTION/ DROPS OPHTHALMIC NIGHTLY
Status: DISCONTINUED | OUTPATIENT
Start: 2024-12-10 | End: 2024-12-12 | Stop reason: HOSPADM

## 2024-12-10 RX ORDER — MAGNESIUM SULFATE IN WATER 40 MG/ML
2000 INJECTION, SOLUTION INTRAVENOUS PRN
Status: DISCONTINUED | OUTPATIENT
Start: 2024-12-10 | End: 2024-12-12 | Stop reason: HOSPADM

## 2024-12-10 RX ORDER — IODIXANOL 320 MG/ML
80 INJECTION, SOLUTION INTRAVASCULAR
Status: COMPLETED | OUTPATIENT
Start: 2024-12-10 | End: 2024-12-10

## 2024-12-10 RX ORDER — SODIUM CHLORIDE, SODIUM LACTATE, POTASSIUM CHLORIDE, CALCIUM CHLORIDE 600; 310; 30; 20 MG/100ML; MG/100ML; MG/100ML; MG/100ML
INJECTION, SOLUTION INTRAVENOUS CONTINUOUS
Status: DISPENSED | OUTPATIENT
Start: 2024-12-10 | End: 2024-12-11

## 2024-12-10 RX ORDER — LISINOPRIL 40 MG/1
40 TABLET ORAL DAILY
Status: DISCONTINUED | OUTPATIENT
Start: 2024-12-11 | End: 2024-12-12 | Stop reason: HOSPADM

## 2024-12-10 RX ORDER — ROSUVASTATIN CALCIUM 20 MG/1
20 TABLET, COATED ORAL DAILY
Status: DISCONTINUED | OUTPATIENT
Start: 2024-12-11 | End: 2024-12-12 | Stop reason: HOSPADM

## 2024-12-10 RX ORDER — ONDANSETRON 2 MG/ML
4 INJECTION INTRAMUSCULAR; INTRAVENOUS EVERY 6 HOURS PRN
Status: DISCONTINUED | OUTPATIENT
Start: 2024-12-10 | End: 2024-12-12 | Stop reason: HOSPADM

## 2024-12-10 RX ORDER — PSEUDOEPHEDRINE HCL 30 MG
200 TABLET ORAL
Status: ON HOLD | COMMUNITY
Start: 2024-10-08 | End: 2024-12-12 | Stop reason: HOSPADM

## 2024-12-10 RX ORDER — BISACODYL 10 MG
10 SUPPOSITORY, RECTAL RECTAL DAILY PRN
Status: DISCONTINUED | OUTPATIENT
Start: 2024-12-10 | End: 2024-12-12 | Stop reason: HOSPADM

## 2024-12-10 RX ORDER — PANTOPRAZOLE SODIUM 40 MG/1
40 TABLET, DELAYED RELEASE ORAL
Status: DISCONTINUED | OUTPATIENT
Start: 2024-12-11 | End: 2024-12-10

## 2024-12-10 RX ORDER — ACETAMINOPHEN 650 MG/1
650 SUPPOSITORY RECTAL EVERY 6 HOURS PRN
Status: DISCONTINUED | OUTPATIENT
Start: 2024-12-10 | End: 2024-12-12 | Stop reason: HOSPADM

## 2024-12-10 RX ORDER — ACETAMINOPHEN 325 MG/1
650 TABLET ORAL EVERY 6 HOURS PRN
Status: DISCONTINUED | OUTPATIENT
Start: 2024-12-10 | End: 2024-12-12 | Stop reason: HOSPADM

## 2024-12-10 RX ORDER — POTASSIUM CHLORIDE 7.45 MG/ML
10 INJECTION INTRAVENOUS PRN
Status: DISCONTINUED | OUTPATIENT
Start: 2024-12-10 | End: 2024-12-12 | Stop reason: HOSPADM

## 2024-12-10 RX ORDER — AMLODIPINE BESYLATE 5 MG/1
5 TABLET ORAL DAILY
Status: DISCONTINUED | OUTPATIENT
Start: 2024-12-11 | End: 2024-12-12 | Stop reason: HOSPADM

## 2024-12-10 RX ORDER — DICYCLOMINE HYDROCHLORIDE 10 MG/1
20 CAPSULE ORAL 4 TIMES DAILY PRN
Status: DISCONTINUED | OUTPATIENT
Start: 2024-12-10 | End: 2024-12-12 | Stop reason: HOSPADM

## 2024-12-10 RX ORDER — PANTOPRAZOLE SODIUM 40 MG/1
40 TABLET, DELAYED RELEASE ORAL
Status: DISCONTINUED | OUTPATIENT
Start: 2024-12-11 | End: 2024-12-11

## 2024-12-10 RX ORDER — HYDROCHLOROTHIAZIDE 25 MG/1
25 TABLET ORAL DAILY
Status: DISCONTINUED | OUTPATIENT
Start: 2024-12-11 | End: 2024-12-11

## 2024-12-10 RX ORDER — ONDANSETRON 4 MG/1
4 TABLET, ORALLY DISINTEGRATING ORAL EVERY 8 HOURS PRN
Status: DISCONTINUED | OUTPATIENT
Start: 2024-12-10 | End: 2024-12-12 | Stop reason: HOSPADM

## 2024-12-10 RX ORDER — SODIUM CHLORIDE 0.9 % (FLUSH) 0.9 %
5-40 SYRINGE (ML) INJECTION EVERY 12 HOURS SCHEDULED
Status: DISCONTINUED | OUTPATIENT
Start: 2024-12-10 | End: 2024-12-12 | Stop reason: HOSPADM

## 2024-12-10 RX ORDER — CETIRIZINE HYDROCHLORIDE 10 MG/1
5 TABLET ORAL DAILY
Status: DISCONTINUED | OUTPATIENT
Start: 2024-12-11 | End: 2024-12-12 | Stop reason: HOSPADM

## 2024-12-10 RX ORDER — SIMETHICONE 80 MG
160 TABLET,CHEWABLE ORAL EVERY 6 HOURS PRN
Status: ON HOLD | COMMUNITY
Start: 2024-12-04 | End: 2024-12-12 | Stop reason: HOSPADM

## 2024-12-10 RX ORDER — POLYETHYLENE GLYCOL 3350 17 G/17G
17 POWDER, FOR SOLUTION ORAL DAILY PRN
Status: DISCONTINUED | OUTPATIENT
Start: 2024-12-10 | End: 2024-12-12 | Stop reason: HOSPADM

## 2024-12-10 RX ORDER — TIMOLOL MALEATE 5 MG/ML
1 SOLUTION/ DROPS OPHTHALMIC 2 TIMES DAILY
Status: DISCONTINUED | OUTPATIENT
Start: 2024-12-10 | End: 2024-12-12 | Stop reason: HOSPADM

## 2024-12-10 RX ADMIN — ENOXAPARIN SODIUM 40 MG: 100 INJECTION SUBCUTANEOUS at 15:18

## 2024-12-10 RX ADMIN — SODIUM CHLORIDE 40 MG: 9 INJECTION INTRAMUSCULAR; INTRAVENOUS; SUBCUTANEOUS at 15:02

## 2024-12-10 RX ADMIN — SODIUM CHLORIDE, POTASSIUM CHLORIDE, SODIUM LACTATE AND CALCIUM CHLORIDE: 600; 310; 30; 20 INJECTION, SOLUTION INTRAVENOUS at 17:24

## 2024-12-10 RX ADMIN — TIMOLOL MALEATE 1 DROP: 5 SOLUTION OPHTHALMIC at 21:26

## 2024-12-10 RX ADMIN — IODIXANOL 80 ML: 320 INJECTION, SOLUTION INTRAVASCULAR at 12:01

## 2024-12-10 RX ADMIN — LATANOPROST 1 DROP: 50 SOLUTION OPHTHALMIC at 21:27

## 2024-12-10 RX ADMIN — SODIUM CHLORIDE, PRESERVATIVE FREE 10 ML: 5 INJECTION INTRAVENOUS at 21:27

## 2024-12-10 ASSESSMENT — PAIN - FUNCTIONAL ASSESSMENT: PAIN_FUNCTIONAL_ASSESSMENT: NONE - DENIES PAIN

## 2024-12-10 ASSESSMENT — PAIN SCALES - GENERAL
PAINLEVEL_OUTOF10: 0

## 2024-12-10 ASSESSMENT — ENCOUNTER SYMPTOMS
CHEST TIGHTNESS: 0
EYES NEGATIVE: 1
ABDOMINAL PAIN: 1
SHORTNESS OF BREATH: 1

## 2024-12-10 ASSESSMENT — HEART SCORE: ECG: NON-SPECIFC REPOLARIZATION DISTURBANCE/LBTB/PM

## 2024-12-10 NOTE — ED TRIAGE NOTES
Pt presents to ed via ems with c/o dizziness and lightheadness. Pt also stated he has ha abdominal pain x1 week and [facility]    Started feeling dizziness/lightheadedness today today and called ems.     Hx hbp with baby aspirin. Prescribed viagra but has not taken in a month.     Denies sob/cp/n/v/d    Given 400ml fluid via ems     Bg 132    Bp 87 systolic

## 2024-12-10 NOTE — ED PROVIDER NOTES
DO Fernanda 4:29 PM      Diagnosis     Clinical Impression:   1. Abnormal electrocardiography    2. Shortness of breath    3. Abnormal nuclear stress test    4. CAD (coronary artery disease)    5. Other fatigue    6. Abnormal EKG        Disposition: Admit     No follow-up provider specified.     Disclaimer: Sections of this note are dictated using utilizing voice recognition software.  Minor typographical errors may be present. If questions arise, please do not hesitate to contact me or call our department.         Miguel Michael MD  12/13/24 8982

## 2024-12-10 NOTE — H&P
History and Physical          Subjective     HPI: Katlyn Henry is a 79 y.o. male with a PMHx of DM, GERD, HLD, HTN who presented to the ED with c/o SOB with exertion and extreme fatigue that started this morning and was associated with nausea. For \"a while\" patient has been experiencing intermittent lower abdominal cramping that he thinks tends to occur around 3pm every day. He doesn't know anything that makes it better or worse. He's been having poor PO intake because of it. [facility], but had to cancel due to coming to the ER. He denies chest pain, cough, fever, vomiting, diarrhea, leg swelling.     In the ED, VSS. Labs with Cr 1.81 with a baseline of 1.2. Trop 10 x2. BNP 94. CXR negative. CTH negative for acute findings. CTAP showed distal gastric wall thickening/edematous changes potentially suggestive of gastritis and large duodenal diverticulum.     PMHx:  Past Medical History:   Diagnosis Date    Cataract of left eye 05/02/2023    Chronic abdominal pain 05/02/2023    Chronic constipation 05/02/2023    Degenerative disc disease, cervical 05/03/2023    Environmental and seasonal allergies 05/02/2023    Former smoker 05/02/2023    Gastroesophageal reflux disease without esophagitis 05/02/2023    Hypercholesteremia 05/02/2023    Hyperlipidemia 06/26/2023    Hypoproteinemia (HCC) 05/02/2024    Lipoma of head 05/02/2023    Long term current use of non-steroidal anti-inflammatories (NSAID) 05/02/2023    Male erectile dysfunction, unspecified 05/02/2023    Numbness and tingling of both upper extremities 05/02/2023    Obstructive sleep apnea (adult) (pediatric) 06/26/2023    Primary hypertension 05/02/2023    Primary open-angle glaucoma, left eye, stage unspecified 06/26/2023    Sensorineural hearing loss, bilateral 06/26/2023    Type 2 diabetes mellitus with diabetic neuropathy, without long-term current use of insulin (HCC) 05/02/2023  volumetric CT imaging of the head is performed from the base of the skull to the vertex without IV contrast administration.  Axial, coronal and sagittal images are generated from the volumetric data set.  Dose optimization techniques are utilized as appropriate to the performed exam with combination of automated exposure control, adjustment of mA and/or kV according to patient size, and use of iterative reconstructive technique. FINDINGS: Brain: - Hemorrhage/ hematoma:  No acute intracranial hemorrhage/ hematoma. - Mass, mass effect:  None. - Gray-white matter differentiation:  Minimal periventricular white matter hypodensities are noted bilaterally, suggestive of chronic microvascular ischemic changes. - Interval assessment:  No significant interval change. CSF spaces:  No evidence of abnormal effacement or enlargement. Calvarium:  Intact. Sinuses, mastoids:  Mucosal thickening in the paranasal sinuses with mucous debris.     1.  No acute intracranial abnormalities. 2.  Minimal chronic microvascular white matter changes. 3.  Mucous debris and mucosal thickening in the paranasal sinuses. Electronically signed by Wagner Dunham    CT ABDOMEN PELVIS W IV CONTRAST Additional Contrast? Radiologist Recommendation    Result Date: 12/10/2024  EXAM:  CT Abdomen-Pelvis with Contrast. CLINICAL INDICATION:  Abdominal pain for 1 week with weight loss.  Dizziness and lightheadedness. COMPARISON: None. TECHNIQUE:  Helical volumetric CT imaging of the abdomen and pelvis is performed following IV contrast administration.  Coronal and sagittal multiplanar reconstruction images are generated for improved anatomic delineation.  All CT scans at this facility are performed using dose optimization technique as appropriate to the performed exam, to include automated exposure control, adjustment of the mA and/or kV according to patient's size (including appropriate matching for site-specific examinations), or use of iterative reconstruction

## 2024-12-10 NOTE — ED NOTES
TRANSFER - OUT REPORT:    Verbal report given to Nisha on Katlyn Henry  being transferred to [facility] for routine progression of patient care       Report consisted of patient's Situation, Background, Assessment and   Recommendations(SBAR).     Information from the following report(s) Nurse Handoff Report, ED Encounter Summary, and MAR was reviewed with the receiving nurse.    [place] Fall Assessment:    Presents to emergency department  because of falls (Syncope, seizure, or loss of consciousness): No  Age > 70: Yes  Altered Mental Status, Intoxication with alcohol or substance confusion (Disorientation, impaired judgment, poor safety awaremess, or inability to follow instructions): No  Impaired Mobility: Ambulates or transfers with assistive devices or assistance; Unable to ambulate or transer.: No  Nursing Judgement: Yes          Lines:   Peripheral IV 12/10/24 Right Antecubital (Active)   Site Assessment Clean, dry & intact 12/10/24 1055   Line Status Blood return noted 12/10/24 1055   Line Care Chlorhexidine wipes 12/10/24 1055   Phlebitis Assessment No symptoms 12/10/24 1055   Infiltration Assessment 0 12/10/24 1055   Dressing Status New dressing applied 12/10/24 1055   Dressing Type Transparent 12/10/24 1055   Dressing Intervention New 12/10/24 1055        Opportunity for questions and clarification was provided.      Patient transported with:  Monitor and Registered Nurse

## 2024-12-10 NOTE — CARE COORDINATION
Initial assessment completed and patient demographics verified      12/10/24 2640   Service Assessment   Patient Orientation Alert and Oriented   Cognition Alert   History Provided By Patient   Primary Caregiver Self   Accompanied By/Relationship Spouse   Support Systems Spouse/Significant Other   Patient's Healthcare Decision Maker is: Legal Next of Kin   PCP Verified by CM Yes   Last Visit to PCP Within last 3 months   Prior Functional Level Independent in ADLs/IADLs   Current Functional Level Independent in ADLs/IADLs   Can patient return to prior living arrangement Yes   Ability to make needs known: Good   Family able to assist with home care needs: Yes   Would you like for me to discuss the discharge plan with any other family members/significant others, and if so, who? Yes  (Spouse)   Financial Resources Medicare;[place] ([facility])   Community Resources None   CM/SW Referral Other (see comment)   Social/Functional History   Lives With Spouse   Type of Home House   Home Layout Two level   Home Access Stairs to enter with rails   Entrance Stairs - Number of Steps 4   Entrance Stairs - Rails Both   Bathroom Shower/Tub Walk-in shower   Bathroom Toilet Standard   Bathroom Equipment None   Bathroom Accessibility Accessible   Home Equipment None   Receives Help From Family   Prior Level of Assist for ADLs Independent   Prior Level of Assist for Homemaking Independent   Homemaking Responsibilities Yes   Ambulation Assistance Independent   Prior Level of Assist for Transfers Independent   Active  Yes   Mode of Transportation Car   Occupation Retired   Discharge Planning   Type of Residence House   Living Arrangements Spouse/Significant Other   Current Services Prior To Admission None   Potential Assistance Needed N/A   DME Ordered? No   Potential Assistance Purchasing Medications No   Type of Home Care Services None   Patient expects to be discharged to: House   One/Two Story Residence Two story   # of Interior Steps  13   Interior Rails Left   Lift Chair Available No   History of falls? 0   Services At/After Discharge   Transition of Care Consult (CM Consult) Discharge Planning   Services At/After Discharge None    Resource Information Provided? No   Mode of Transport at Discharge Other (see comment)  (Spouse will transport home)   Confirm Follow Up Transport Self   Condition of Participation: Discharge Planning   The Plan for Transition of Care is related to the following treatment goals: Dispo home with family support and necessary resources. Star rated home health and skilled nursing facility given to wife and pt for review.   The Patient and/or Patient Representative was provided with a Choice of Provider? Patient   The Patient and/Or Patient Representative agree with the Discharge Plan? Yes   Freedom of Choice list was provided with basic dialogue that supports the patient's individualized plan of care/goals, treatment preferences, and shares the quality data associated with the providers?  Yes   Lisa CARRINGTON RN  Case Management  [phone]

## 2024-12-10 NOTE — CONSULTS
[facility] - Consult Note    Cardiology consultation request from Tyra Gaines PA-C  for evaluation and management/treatment of abnormal ECG, SOB    Date of  Admission: 12/10/2024  8:45 AM   Primary Care Physician:  Huong Palomino, SHY    Attending Cardiologist: Dr. Johnathon Maldonado      Assessment:     -Abdominal pain; dyspnea on exertion; fatigue; dizziness. MI r/o. Initial ECG with PATEL in the anteroseptal leads but not consistent with STEMI criteria, q waves vs poor R wave progression.   -LELE.   -Chronic diastolic dysfunction. Echo 2018 with LVEF of 55%, normal wall thickness. CXR negative, lungs CTA BL.   -LVH.   -HTN, hypotensive PTA with SBP in the 80s. On HCTZ and lisinopril as outpatient.   -HLD  -DM2, last HgbA1c 7.2 11/2024.   -LILIANE, non compliant with cpap  -ED, on viagra.      Primary cardiologist, previously at [facility]. Last seen 2018.      Plan:       I saw, evaluated, interviewed and examined the patient personally.  Patient admitted to the hospital after having nonspecific complaints of severe abdominal pain as well as some fatigue tiredness and some shortness of breath on and off.  Patient has the symptoms for the last several months.  Apparently was at the [facility] system for getting ultrasound where the blood pressure was found to be low as well.  Taking 2 antihypertensive at home  No chest pain or chest tightness.  Appears fatigue  Hemodynamically stable.  Blood pressure normal.  No Rales on exam.  Abdomen is soft.  No obvious murmur.  No edema  EKG reviewed which showed LVH with some repolarization.  ST elevation most likely from repolarization  Cardiac troponins normal    Recommendation  - Dyspnea.  EKG LVH with likely repolarization abnormality.  Troponin is unremarkable.  Do not suspect angina equivalent.  Stat bedside echo performed which showed normal wall motion preliminary.  For echo to follow  - Treatment and management of abdominal pain to primary team  - Aspirin,  (150 lb)   05/02/24 69.9 kg (154 lb)       General:  alert, appears stated age, and cooperative  Neck:  no JVD  Lungs:  clear to auscultation bilaterally  Heart:  regular rate and rhythm, S1, S2 normal, no murmur, click, rub or gallop  Abdomen:  abdomen is soft without significant tenderness, masses, organomegaly or guarding  Extremities:  extremities normal, atraumatic, no cyanosis or edema  Skin: warm and dry, no hyperpigmentation, vitiligo, or suspicious lesions  Neuro: alert, oriented x3, affect appropriate  Psych: non focal     Data Review:     Recent Labs     12/10/24  0916   WBC 4.2*   HGB 13.6   HCT 40.0        Recent Labs     12/10/24  0916      K 4.5      CO2 22   BUN 20*   CREATININE 1.81*   MG 2.1   ALT 26       All Cardiac Markers in the last 24 hours:    Lab Results   Component Value Date/Time    TROPHS 10 12/10/2024 10:55 AM    TROPHS 10 12/10/2024 09:16 AM     No results found for: \"NTPROBNP\"    Last Lipid:    Lab Results   Component Value Date/Time    CHOL 134 11/19/2024 08:49 AM    HDL 45 11/19/2024 08:49 AM    LDL 75 11/19/2024 08:49 AM     04/25/2024 08:27 AM       Cardiographics:     No results found for this or any previous visit (from the past 4464 hour(s)).  No results found for this or any previous visit.    No results found for this or any previous visit.    No results found for this or any previous visit.    Xray Result (most recent):  XR CHEST LIMITED ONE VIEW 12/10/2024    Narrative  XR CHEST 1 VIEW    Indication: Fatigue    Comparison: None    Findings:    Heart size is normal.    The lungs are clear. No pleural effusion or pneumothorax.    The visualized osseous structures are unremarkable.    Impression  1.  No acute cardiopulmonary process.    Electronically signed by Satnam Isaac      Signed By: Brenda Warren PA-C     December 10, 2024

## 2024-12-10 NOTE — PROGRESS NOTES
4 Eyes Skin Assessment     NAME:  Katlyn Henry  YOB: 1945  MEDICAL RECORD NUMBER:  [id number]    The patient is being assessed for  Shift Handoff    I agree that at least one RN has performed a thorough Head to Toe Skin Assessment on the patient. ALL assessment sites listed below have been assessed.      Areas assessed by both nurses:    Head, Face, Ears, Shoulders, Back, Chest, Arms, Elbows, Hands, Sacrum. Buttock, Coccyx, Ischium, and Legs. Feet and Heels        Does the Patient have a Wound? No noted wound(s)       Liban Prevention initiated by RN: No  Wound Care Orders initiated by RN: No    Pressure Injury (Stage 3,4, Unstageable, DTI, NWPT, and Complex wounds) if present, place Wound referral order by RN under : No    New Ostomies, if present place, Ostomy referral order under : No     Nurse 1 eSignature: Electronically signed by Nisah Wade RN on 12/10/24 at 6:42 PM EST    **SHARE this note so that the co-signing nurse can place an eSignature**    Nurse 2 eSignature: Electronically signed by YADY AGUILA RN on 12/11/24 at 4:51 AM EST

## 2024-12-11 ENCOUNTER — APPOINTMENT (OUTPATIENT)
Facility: HOSPITAL | Age: 79
End: 2024-12-11
Payer: MEDICARE

## 2024-12-11 PROBLEM — R06.00 DYSPNEA: Status: RESOLVED | Noted: 2024-12-10 | Resolved: 2024-12-11

## 2024-12-11 PROBLEM — R10.84 GENERALIZED ABDOMINAL PAIN: Status: RESOLVED | Noted: 2024-11-26 | Resolved: 2024-12-11

## 2024-12-11 PROBLEM — I20.89 ATYPICAL ANGINA (HCC): Status: ACTIVE | Noted: 2024-12-11

## 2024-12-11 PROBLEM — I50.30 (HFPEF) HEART FAILURE WITH PRESERVED EJECTION FRACTION (HCC): Status: ACTIVE | Noted: 2024-12-11

## 2024-12-11 LAB
ANION GAP SERPL CALC-SCNC: 6 MMOL/L (ref 3–18)
BACTERIA SPEC CULT: NORMAL
BASOPHILS # BLD: 0 K/UL (ref 0–0.1)
BASOPHILS NFR BLD: 1 % (ref 0–2)
BUN SERPL-MCNC: 20 MG/DL (ref 7–18)
BUN/CREAT SERPL: 16 (ref 12–20)
CALCIUM SERPL-MCNC: 8.9 MG/DL (ref 8.5–10.1)
CHLORIDE SERPL-SCNC: 107 MMOL/L (ref 100–111)
CO2 SERPL-SCNC: 25 MMOL/L (ref 21–32)
CREAT SERPL-MCNC: 1.22 MG/DL (ref 0.6–1.3)
DIFFERENTIAL METHOD BLD: ABNORMAL
ECHO BSA: 1.79 M2
EOSINOPHIL # BLD: 0.1 K/UL (ref 0–0.4)
EOSINOPHIL NFR BLD: 3 % (ref 0–5)
ERYTHROCYTE [DISTWIDTH] IN BLOOD BY AUTOMATED COUNT: 13.3 % (ref 11.6–14.5)
EST. AVERAGE GLUCOSE BLD GHB EST-MCNC: 143 MG/DL
GLUCOSE BLD STRIP.AUTO-MCNC: 104 MG/DL (ref 70–110)
GLUCOSE BLD STRIP.AUTO-MCNC: 150 MG/DL (ref 70–110)
GLUCOSE BLD STRIP.AUTO-MCNC: 92 MG/DL (ref 70–110)
GLUCOSE BLD STRIP.AUTO-MCNC: 98 MG/DL (ref 70–110)
GLUCOSE SERPL-MCNC: 87 MG/DL (ref 74–99)
HBA1C MFR BLD: 6.6 % (ref 4.2–5.6)
HCT VFR BLD AUTO: 36.7 % (ref 36–48)
HGB BLD-MCNC: 12.3 G/DL (ref 13–16)
IMM GRANULOCYTES # BLD AUTO: 0 K/UL (ref 0–0.04)
IMM GRANULOCYTES NFR BLD AUTO: 1 % (ref 0–0.5)
LYMPHOCYTES # BLD: 1.5 K/UL (ref 0.9–3.6)
LYMPHOCYTES NFR BLD: 45 % (ref 21–52)
MCH RBC QN AUTO: 27.8 PG (ref 24–34)
MCHC RBC AUTO-ENTMCNC: 33.5 G/DL (ref 31–37)
MCV RBC AUTO: 82.8 FL (ref 78–100)
MONOCYTES # BLD: 0.5 K/UL (ref 0.05–1.2)
MONOCYTES NFR BLD: 15 % (ref 3–10)
NEUTS SEG # BLD: 1.2 K/UL (ref 1.8–8)
NEUTS SEG NFR BLD: 36 % (ref 40–73)
NRBC # BLD: 0 K/UL (ref 0–0.01)
NRBC BLD-RTO: 0 PER 100 WBC
NUC STRESS EJECTION FRACTION: 38 %
PLATELET # BLD AUTO: 291 K/UL (ref 135–420)
PMV BLD AUTO: 9.4 FL (ref 9.2–11.8)
POTASSIUM SERPL-SCNC: 3.9 MMOL/L (ref 3.5–5.5)
RBC # BLD AUTO: 4.43 M/UL (ref 4.35–5.65)
SERVICE CMNT-IMP: NORMAL
SODIUM SERPL-SCNC: 138 MMOL/L (ref 136–145)
STRESS BASELINE DIAS BP: 76 MMHG
STRESS BASELINE HR: 52 BPM
STRESS BASELINE SYS BP: 138 MMHG
STRESS ESTIMATED WORKLOAD: 1 METS
STRESS EXERCISE DUR MIN: 4 MIN
STRESS EXERCISE DUR SEC: 0 SEC
STRESS PEAK DIAS BP: 76 MMHG
STRESS PEAK SYS BP: 138 MMHG
STRESS PERCENT HR ACHIEVED: 66 %
STRESS POST PEAK HR: 95 BPM
STRESS RATE PRESSURE PRODUCT: NORMAL BPM*MMHG
STRESS TARGET HR: 143 BPM
TID: 1.1
WBC # BLD AUTO: 3.3 K/UL (ref 4.6–13.2)

## 2024-12-11 PROCEDURE — 96372 THER/PROPH/DIAG INJ SC/IM: CPT

## 2024-12-11 PROCEDURE — 97535 SELF CARE MNGMENT TRAINING: CPT

## 2024-12-11 PROCEDURE — G0378 HOSPITAL OBSERVATION PER HR: HCPCS

## 2024-12-11 PROCEDURE — 82962 GLUCOSE BLOOD TEST: CPT

## 2024-12-11 PROCEDURE — 97165 OT EVAL LOW COMPLEX 30 MIN: CPT

## 2024-12-11 PROCEDURE — 36415 COLL VENOUS BLD VENIPUNCTURE: CPT

## 2024-12-11 PROCEDURE — 94761 N-INVAS EAR/PLS OXIMETRY MLT: CPT

## 2024-12-11 PROCEDURE — 97162 PT EVAL MOD COMPLEX 30 MIN: CPT

## 2024-12-11 PROCEDURE — 83036 HEMOGLOBIN GLYCOSYLATED A1C: CPT

## 2024-12-11 PROCEDURE — 80048 BASIC METABOLIC PNL TOTAL CA: CPT

## 2024-12-11 PROCEDURE — 6360000002 HC RX W HCPCS: Performed by: PHYSICIAN ASSISTANT

## 2024-12-11 PROCEDURE — A9502 TC99M TETROFOSMIN: HCPCS | Performed by: PHYSICIAN ASSISTANT

## 2024-12-11 PROCEDURE — 2580000003 HC RX 258: Performed by: PHYSICIAN ASSISTANT

## 2024-12-11 PROCEDURE — 85025 COMPLETE CBC W/AUTO DIFF WBC: CPT

## 2024-12-11 PROCEDURE — 3430000000 HC RX DIAGNOSTIC RADIOPHARMACEUTICAL: Performed by: PHYSICIAN ASSISTANT

## 2024-12-11 PROCEDURE — 99233 SBSQ HOSP IP/OBS HIGH 50: CPT | Performed by: INTERNAL MEDICINE

## 2024-12-11 PROCEDURE — 93017 CV STRESS TEST TRACING ONLY: CPT

## 2024-12-11 PROCEDURE — 99232 SBSQ HOSP IP/OBS MODERATE 35: CPT | Performed by: STUDENT IN AN ORGANIZED HEALTH CARE EDUCATION/TRAINING PROGRAM

## 2024-12-11 PROCEDURE — 6370000000 HC RX 637 (ALT 250 FOR IP): Performed by: PHYSICIAN ASSISTANT

## 2024-12-11 PROCEDURE — 97530 THERAPEUTIC ACTIVITIES: CPT

## 2024-12-11 RX ORDER — PANTOPRAZOLE SODIUM 40 MG/1
40 TABLET, DELAYED RELEASE ORAL
Status: DISCONTINUED | OUTPATIENT
Start: 2024-12-12 | End: 2024-12-12 | Stop reason: HOSPADM

## 2024-12-11 RX ORDER — REGADENOSON 0.08 MG/ML
0.4 INJECTION, SOLUTION INTRAVENOUS
Status: COMPLETED | OUTPATIENT
Start: 2024-12-11 | End: 2024-12-11

## 2024-12-11 RX ADMIN — LATANOPROST 1 DROP: 50 SOLUTION OPHTHALMIC at 22:40

## 2024-12-11 RX ADMIN — REGADENOSON 0.4 MG: 0.08 INJECTION, SOLUTION INTRAVENOUS at 10:32

## 2024-12-11 RX ADMIN — ASPIRIN 81 MG: 81 TABLET, COATED ORAL at 09:25

## 2024-12-11 RX ADMIN — TETROFOSMIN 33 MILLICURIE: 1.38 INJECTION, POWDER, LYOPHILIZED, FOR SOLUTION INTRAVENOUS at 10:12

## 2024-12-11 RX ADMIN — TIMOLOL MALEATE 1 DROP: 5 SOLUTION OPHTHALMIC at 22:41

## 2024-12-11 RX ADMIN — ROSUVASTATIN CALCIUM 20 MG: 20 TABLET, FILM COATED ORAL at 09:24

## 2024-12-11 RX ADMIN — CETIRIZINE HYDROCHLORIDE 5 MG: 10 TABLET, FILM COATED ORAL at 09:26

## 2024-12-11 RX ADMIN — PANTOPRAZOLE SODIUM 40 MG: 40 TABLET, DELAYED RELEASE ORAL at 06:24

## 2024-12-11 RX ADMIN — TETROFOSMIN 11 MILLICURIE: 1.38 INJECTION, POWDER, LYOPHILIZED, FOR SOLUTION INTRAVENOUS at 08:00

## 2024-12-11 RX ADMIN — SODIUM CHLORIDE, PRESERVATIVE FREE 10 ML: 5 INJECTION INTRAVENOUS at 22:44

## 2024-12-11 RX ADMIN — ENOXAPARIN SODIUM 40 MG: 100 INJECTION SUBCUTANEOUS at 09:25

## 2024-12-11 RX ADMIN — SODIUM CHLORIDE, PRESERVATIVE FREE 10 ML: 5 INJECTION INTRAVENOUS at 09:30

## 2024-12-11 ASSESSMENT — PAIN SCALES - GENERAL
PAINLEVEL_OUTOF10: 0

## 2024-12-11 NOTE — PROGRESS NOTES
OCCUPATIONAL THERAPY EVALUATION/DISCHARGE    Patient: Ktalyn Henry (79 y.o. male)  Date: 12/11/2024  Primary Diagnosis: Shortness of breath [R06.02]  Dyspnea [R06.00]  Abnormal electrocardiography [R94.31]  Chest pain, unspecified type [R07.9]       Precautions: General Precautions  PLOF: Pt lives with spouse, independent    ASSESSMENT AND RECOMMENDATIONS:    Based on the objective data below, patient presents with ability to perform basic ADLs and functional transfers at baseline level of function. Pt performed/simulated UB/LB ADLs with Mod Ind for seated and std aspects. Pt's BP in sitting 115/64, standing 93/54 with pt initially reporting lightheadedness which improved with time. Pt demonstrates good safety awareness during all standing tasks and functional mobility without AD. Pt lives with supportive family available to assist as needed.   Maximum therapeutic gains met at current level of care and patient will be discharged from occupational therapy at this time.    Further Equipment Recommendations for Discharge: None    AMPA: AM-PAC Inpatient Daily Activity Raw Score: 24    At this time and based on an AM-PAC score, no further OT is recommended upon discharge.  Recommend patient returns to prior setting with prior services.    This [facility] score should be considered in conjunction with interdisciplinary team recommendations to determine the most appropriate discharge setting. Patient's social support, diagnosis, medical stability, and prior level of function should also be taken into consideration.     SUBJECTIVE:   Patient stated “I feel a little light.”    OBJECTIVE DATA SUMMARY:     Past Medical History:   Diagnosis Date    Cataract of left eye 05/02/2023    Chronic abdominal pain 05/02/2023    Chronic constipation 05/02/2023    Degenerative disc disease, cervical 05/03/2023    Environmental and seasonal allergies 05/02/2023    Former smoker 05/02/2023    Gastroesophageal reflux disease without  esophagitis 05/02/2023    Hypercholesteremia 05/02/2023    Hyperlipidemia 06/26/2023    Hypoproteinemia (HCC) 05/02/2024    Lipoma of head 05/02/2023    Long term current use of non-steroidal anti-inflammatories (NSAID) 05/02/2023    Male erectile dysfunction, unspecified 05/02/2023    Numbness and tingling of both upper extremities 05/02/2023    Obstructive sleep apnea (adult) (pediatric) 06/26/2023    Primary hypertension 05/02/2023    Primary open-angle glaucoma, left eye, stage unspecified 06/26/2023    Sensorineural hearing loss, bilateral 06/26/2023    Type 2 diabetes mellitus with diabetic neuropathy, without long-term current use of insulin (HCC) 05/02/2023   No past surgical history on file.    Home Situation:   Social/Functional History  Lives With: Spouse  Type of Home: House  Home Layout: Two level  Home Access: Stairs to enter with rails  Entrance Stairs - Number of Steps: 4  Entrance Stairs - Rails: Both  Bathroom Shower/Tub: Walk-in shower  Bathroom Toilet: Standard  Bathroom Equipment: None  Bathroom Accessibility: Accessible  Home Equipment: Cane  Receives Help From: Family  Prior Level of Assist for ADLs: Independent  Prior Level of Assist for Homemaking: Independent  Homemaking Responsibilities: Yes  Prior Level of Assist for Transfers: Independent  Active : Yes  Mode of Transportation: Car  Occupation: Retired  [x]  Right hand dominant   []  Left hand dominant    Cognitive/Behavioral Status:  Orientation  Overall Orientation Status: Within Functional Limits  Orientation Level: Oriented X4  Cognition  Overall Cognitive Status: WFL    Skin: visible skin intact  Edema: none noted    Vision/Perceptual:    Vision  Vision: Within Functional Limits       Coordination: BUE  Coordination: Generally decreased, functional       Balance:     Balance  Sitting: Intact  Standing: Intact    Strength: BUE  Strength: Generally decreased, functional    Tone & Sensation: BUE  Tone: Normal  Sensation:

## 2024-12-11 NOTE — PROGRESS NOTES
abnormalities.  The appendix is normal.  No acute colon abnormalities.  Mild diverticulosis coli. 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 Nodes:  No mesenteric or retroperitoneal adenopathy. Vascular:  No acute aortic abnormalities. Bones:  No lytic lesions or acute bony abnormalities.     1.  Distal gastric wall thickening/ edematous changes, potentially suggestive of gastritis. 2.  Large duodenal diverticulum. Electronically signed by Wagner Dunham    XR CHEST 1 VIEW    Result Date: 12/10/2024  XR CHEST 1 VIEW Indication: Fatigue Comparison: None Findings: Heart size is normal. The lungs are clear. No pleural effusion or pneumothorax. The visualized osseous structures are unremarkable.     1.  No acute cardiopulmonary process. Electronically signed by Satnam Isaac      Assessment/Plan:     Atypical angina  Versus pain secondary to gastritis; results of the Lexiscan myocardial perfusion stress test and abnormal.  Patient was found to have a partially reversible LV stress perfusion defect.  Cardiology input appreciated-discussed with Dr. Maldonado; keep the patient n.p.o. after midnight for cardiac catheterization tomorrow  Continue patient on aspirin and rosuvastatin    2.  Acute kidney injury  Likely prerenal and secondary to decreased oral intake but renal function has improved  Minimize use of nephrotoxins.  Continue to hold lisinopril.    3.  Acute gastritis  Results of the CT scan of the abdomen pelvis have been reviewed.  Continue patient on a trial of pantoprazole.  The dose has been decreased to 40 mg once a day.    4.  Essential hypertension  Patient is currently normotensive.  Hold antihypertensive medications.    5.  Type 2 diabetes mellitus  Hold oral antidiabetic medications in the hospital.  Continue insulin.  Carbohydrate controlled diet has been ordered      Please contact Dr. Peoples if there are any questions. Greater than 35 minutes were spent reviewing the patient's chart,

## 2024-12-11 NOTE — PROGRESS NOTES
procedure. Will be using moderate sedation    However patient just finished large lunch despite patient was supposed to be n.p.o.  We will keep patient n.p.o. after midnight for cardiac catheterization tomorrow      Johnathon Maldonado MD       NPO for NST today. Please keep NPO until stress test results have been finalized.   BP soft, continue to hold antihypertensives.   If NST is normal, will sign off and be available as needed.     Subjective:     No new complaints. Denies CP or SOB.     Objective:      Patient Vitals for the past 8 hrs:   Temp Pulse Resp BP SpO2   12/11/24 0753 97.5 °F (36.4 °C) 53 16 110/72 96 %   12/11/24 0415 98.1 °F (36.7 °C) 71 16 118/70 96 %         Patient Vitals for the past 96 hrs:   Weight   12/10/24 1515 67.6 kg (149 lb)   12/10/24 1315 68 kg (149 lb 14.6 oz)       TELE: SR, SB                Current Facility-Administered Medications   Medication Dose Route Frequency    regadenoson (LEXISCAN) injection 0.4 mg  0.4 mg IntraVENous ONCE PRN    technetium tetrofosmin (Tc-MYOVIEW) injection 11 millicurie  11 millicurie IntraVENous ONCE PRN    technetium tetrofosmin (Tc-MYOVIEW) injection 33 millicurie  33 millicurie IntraVENous ONCE PRN    sodium chloride flush 0.9 % injection 5-40 mL  5-40 mL IntraVENous 2 times per day    sodium chloride flush 0.9 % injection 5-40 mL  5-40 mL IntraVENous PRN    0.9 % sodium chloride infusion   IntraVENous PRN    potassium chloride (KLOR-CON M) extended release tablet 40 mEq  40 mEq Oral PRN    Or    potassium bicarb-citric acid (EFFER-K) effervescent tablet 40 mEq  40 mEq Oral PRN    Or    potassium chloride 10 mEq/100 mL IVPB (Peripheral Line)  10 mEq IntraVENous PRN    magnesium sulfate 2000 mg in 50 mL IVPB premix  2,000 mg IntraVENous PRN    enoxaparin (LOVENOX) injection 40 mg  40 mg SubCUTAneous Daily    ondansetron (ZOFRAN-ODT) disintegrating tablet 4 mg  4 mg Oral Q8H PRN    Or    ondansetron (ZOFRAN) injection 4 mg  4 mg IntraVENous Q6H PRN     polyethylene glycol (GLYCOLAX) packet 17 g  17 g Oral Daily PRN    bisacodyl (DULCOLAX) suppository 10 mg  10 mg Rectal Daily PRN    acetaminophen (TYLENOL) tablet 650 mg  650 mg Oral Q6H PRN    Or    acetaminophen (TYLENOL) suppository 650 mg  650 mg Rectal Q6H PRN    dicyclomine (BENTYL) capsule 20 mg  20 mg Oral 4x Daily PRN    aspirin EC tablet 81 mg  81 mg Oral Daily    cetirizine (ZYRTEC) tablet 5 mg  5 mg Oral Daily    rosuvastatin (CRESTOR) tablet 20 mg  20 mg Oral Daily    timolol (TIMOPTIC) 0.5 % ophthalmic solution 1 drop  1 drop Both Eyes BID    latanoprost (XALATAN) 0.005 % ophthalmic solution 1 drop  1 drop Left Eye Nightly    [Held by provider] amLODIPine (NORVASC) tablet 5 mg  5 mg Oral Daily    [Held by provider] hydroCHLOROthiazide (HYDRODIURIL) tablet 25 mg  25 mg Oral Daily    [Held by provider] lisinopril (PRINIVIL;ZESTRIL) tablet 40 mg  40 mg Oral Daily    insulin lispro (HUMALOG,ADMELOG) injection vial 0-4 Units  0-4 Units SubCUTAneous 4x Daily AC & HS    glucose chewable tablet 16 g  4 tablet Oral PRN    dextrose bolus 10% 125 mL  125 mL IntraVENous PRN    Or    dextrose bolus 10% 250 mL  250 mL IntraVENous PRN    glucagon injection 1 mg  1 mg SubCUTAneous PRN    dextrose 10 % infusion   IntraVENous Continuous PRN    pantoprazole (PROTONIX) tablet 40 mg  40 mg Oral BID AC         Intake/Output Summary (Last 24 hours) at 12/11/2024 0916  Last data filed at 12/11/2024 0751  Gross per 24 hour   Intake 1140 ml   Output 600 ml   Net 540 ml       Physical Exam:  General:  alert, appears stated age, and cooperative  Neck:  no JVD  Lungs:  clear to auscultation bilaterally  Heart:  regular rate and rhythm, S1, S2 normal, no murmur, click, rub or gallop  Abdomen:  abdomen is soft without significant tenderness, masses, organomegaly or guarding  Extremities:  extremities normal, atraumatic, no cyanosis or edema    Visit Vitals  /72   Pulse 53   Temp 97.5 °F (36.4 °C) (Oral)   Resp 16   Ht 1.702 m

## 2024-12-11 NOTE — PROGRESS NOTES
Physical Therapy    PHYSICAL THERAPY EVALUATION/DISCHARGE    Patient: Katlyn Henry (79 y.o. male)  Date: 12/11/2024  Primary Diagnosis: Shortness of breath [R06.02]  Dyspnea [R06.00]  Abnormal electrocardiography [R94.31]  Chest pain, unspecified type [R07.9]       Precautions: General Precautions   PLOF: pt lives with spouse in a 2 SH, has cane for use if needed     ASSESSMENT AND RECOMMENDATIONS:  Pt received in bed and agreeable. Reports mild dizziness sitting EOB. /64, in standing BP 93/54. Symptoms to not worsen in standing with pt being able to amb to/from bathroom with no AD and brushing teeth at sink. ROM and strength in tact to BLE. Pt is without acute deficits at this time and thus will sign off.     Patient does not require further skilled physical therapy intervention at this level of care.    Further Equipment Recommendations for Discharge: n/a    [facility] Mob[facility] Raw Score : 22      At this time and based on an AM-PAC score, no further PT is recommended upon discharge.  Recommend patient returns to prior setting with prior services.    This [facility] score should be considered in conjunction with interdisciplinary team recommendations to determine the most appropriate discharge setting. Patient's social support, diagnosis, medical stability, and prior level of function should also be taken into consideration.     SUBJECTIVE:   Patient stated “I am a little dizzy.”    OBJECTIVE DATA SUMMARY:     Past Medical History:   Diagnosis Date    Cataract of left eye 05/02/2023    Chronic abdominal pain 05/02/2023    Chronic constipation 05/02/2023    Degenerative disc disease, cervical 05/03/2023    Environmental and seasonal allergies 05/02/2023    Former smoker 05/02/2023    Gastroesophageal reflux disease without esophagitis 05/02/2023    Hypercholesteremia 05/02/2023    Hyperlipidemia 06/26/2023    Hypoproteinemia (HCC) 05/02/2024    Lipoma of head 05/02/2023    Long term current use of

## 2024-12-11 NOTE — PLAN OF CARE
Problem: Chronic Conditions and Co-morbidities  Goal: Patient's chronic conditions and co-morbidity symptoms are monitored and maintained or improved  12/11/2024 1051 by Nisha Wade RN  Outcome: Progressing  12/10/2024 2307 by Aletha Singh RN  Outcome: Progressing  12/10/2024 2306 by Aletha Singh RN  Outcome: Progressing     Problem: Discharge Planning  Goal: Discharge to home or other facility with appropriate resources  12/11/2024 1051 by Nisha Wade RN  Outcome: Progressing  12/10/2024 2307 by Aletha Singh RN  Outcome: Progressing  12/10/2024 2306 by Aletha Singh RN  Outcome: Progressing     Problem: Safety - Adult  Goal: Free from fall injury  12/11/2024 1051 by Nisha Wade RN  Outcome: Progressing  12/10/2024 2307 by Aletha Singh RN  Outcome: Progressing  12/10/2024 2306 by Aletha Singh RN  Outcome: Progressing     Problem: Pain  Goal: Verbalizes/displays adequate comfort level or baseline comfort level  12/11/2024 1051 by Nisha Wade RN  Outcome: Progressing  12/10/2024 2307 by Aletha Singh RN  Outcome: Progressing  12/10/2024 2306 by Aletha Singh RN  Outcome: Progressing     Problem: Musculoskeletal - Adult  Goal: Return ADL status to a safe level of function  12/11/2024 1051 by Nisha Wade RN  Outcome: Progressing  Flowsheets (Taken 12/11/2024 1051)  Return ADL Status to a Safe Level of Function: Assist and instruct patient to increase activity and self care as tolerated  12/10/2024 2307 by Aletha Singh RN  Outcome: Progressing     Problem: Gastrointestinal - Adult  Goal: Maintains or returns to baseline bowel function  12/11/2024 1050 by Nisha Wade RN  Outcome: Progressing  12/10/2024 2307 by Aletha Singh RN  Outcome: Progressing  Goal: Maintains adequate nutritional intake  12/11/2024 1050 by Nisha Wade RN  Outcome: Progressing  12/10/2024 2307 by Aletha Singh RN  Outcome: Progressing

## 2024-12-11 NOTE — PROGRESS NOTES
met patient and spouse at bedside for an initial visit.    Patient's wife said patient came to the hospital with low blood pressure, stomach pain, and he was slow in responding. Patient said he was feeling better today. Patient and wife thanked  for the visit and asked for prayer.      provided presence, support, prayer, and assurance of continued prayer.    Chaplains will provide follow-up care for patient and family as needed.    Spiritual Health History and Assessment/Progress Note  [facility]    Spiritual/Emotional Needs,  ,  ,      Name: Katlyn Henry MRN: [id number]    Age: 79 y.o.     Sex: male   Language: English   Jainism: None   Dyspnea     Date: 12/11/2024            Total Time Calculated: 10 min              Spiritual Assessment began in [facility] 2S TELEMETRY            Encounter Overview/Reason: Spiritual/Emotional Needs  Service Provided For: Patient, Family    Cassi, Belief, Meaning:   Patient identifies as spiritual, is connected with a cassi tradition or spiritual practice, and has beliefs or practices that help with coping during difficult times  Family/Friends identify as spiritual, are connected with a cassi tradition or spiritual practice, and have beliefs or practices that help with coping during difficult times      Importance and Influence:  Patient has spiritual/personal beliefs that influence decisions regarding their health  Family/Friends have spiritual/personal beliefs that influence decisions regarding the patient's health    Community:  Patient is connected with a spiritual community and feels well-supported. Support system includes: Spouse/Partner and Cassi Community  Family/Friends are connected with a spiritual community: and feel well-supported. Support system includes: Spouse/Partner and Cassi Community    Assessment and Plan of Care:     Patient Interventions include: Facilitated expression of thoughts and feelings, Explored spiritual  coping/struggle/distress, and Affirmed coping skills/support systems  Family/Friends Interventions include: Facilitated expression of thoughts and feelings, Explored spiritual coping/struggle/distress, and Affirmed coping skills/support systems    Patient Plan of Care: No spiritual needs identified for follow-up  Family/Friends Plan of Care: No spiritual needs identified for follow-up    Electronically signed by Chaplain Gabby on 12/11/2024 at 3:10 PM

## 2024-12-11 NOTE — PROGRESS NOTES
4 Eyes Skin Assessment     NAME:  Katlyn Henry  YOB: 1945  MEDICAL RECORD NUMBER:  [id number]    The patient is being assessed for  Shift Handoff    I agree that at least one RN has performed a thorough Head to Toe Skin Assessment on the patient. ALL assessment sites listed below have been assessed.      Areas assessed by both nurses:    Head, Face, Ears, Shoulders, Back, Chest, Arms, Elbows, Hands, Sacrum. Buttock, Coccyx, Ischium, and Legs. Feet and Heels        Does the Patient have a Wound? No noted wound(s)       Liban Prevention initiated by RN: No  Wound Care Orders initiated by RN: No    Pressure Injury (Stage 3,4, Unstageable, DTI, NWPT, and Complex wounds) if present, place Wound referral order by RN under : No    New Ostomies, if present place, Ostomy referral order under : No     Nurse 1 eSignature: Electronically signed by Nisha Wade RN on 12/11/24 at 6:17 PM EST    **SHARE this note so that the co-signing nurse can place an eSignature**    Nurse 2 eSignature: {Esignature:[id number]}

## 2024-12-11 NOTE — WOUND CARE
4 Eyes Skin Assessment     NAME:  Katlyn Henry  YOB: 1945  MEDICAL RECORD NUMBER:  [id number]    The patient is being assessed for  Shift Handoff    I agree that at least one RN has performed a thorough Head to Toe Skin Assessment on the patient. ALL assessment sites listed below have been assessed.      Areas assessed by both nurses:    Head, Face, Ears, Shoulders, Back, Chest, Arms, Elbows, Hands, Sacrum. Buttock, Coccyx, Ischium, Legs. Feet and Heels, and Under Medical Devices         Does the Patient have a Wound? No noted wound(s)       Liban Prevention initiated by RN: Yes  Wound Care Orders initiated by RN: No    Pressure Injury (Stage 3,4, Unstageable, DTI, NWPT, and Complex wounds) if present, place Wound referral order by RN under : No    New Ostomies, if present place, Ostomy referral order under : No     Nurse 1 eSignature: Electronically signed by YADY AGUILA RN on 12/11/24 at 4:53 AM EST    **SHARE this note so that the co-signing nurse can place an eSignature**    Nurse 2 eSignature: Electronically signed by Nisha Wade RN on 12/11/24 at 7:48 AM EST

## 2024-12-12 VITALS
OXYGEN SATURATION: 98 % | HEART RATE: 66 BPM | WEIGHT: 149 LBS | SYSTOLIC BLOOD PRESSURE: 129 MMHG | BODY MASS INDEX: 23.39 KG/M2 | DIASTOLIC BLOOD PRESSURE: 67 MMHG | TEMPERATURE: 97.9 F | RESPIRATION RATE: 18 BRPM | HEIGHT: 67 IN

## 2024-12-12 PROBLEM — Z86.79 HISTORY OF HYPERTENSION: Status: ACTIVE | Noted: 2024-12-12

## 2024-12-12 PROBLEM — R06.02 SHORTNESS OF BREATH: Status: ACTIVE | Noted: 2024-12-10

## 2024-12-12 LAB
ANION GAP SERPL CALC-SCNC: 7 MMOL/L (ref 3–18)
BASOPHILS # BLD: 0 K/UL (ref 0–0.1)
BASOPHILS NFR BLD: 1 % (ref 0–2)
BUN SERPL-MCNC: 18 MG/DL (ref 7–18)
BUN/CREAT SERPL: 15 (ref 12–20)
CALCIUM SERPL-MCNC: 8.9 MG/DL (ref 8.5–10.1)
CHLORIDE SERPL-SCNC: 107 MMOL/L (ref 100–111)
CO2 SERPL-SCNC: 24 MMOL/L (ref 21–32)
CREAT SERPL-MCNC: 1.2 MG/DL (ref 0.6–1.3)
DIFFERENTIAL METHOD BLD: ABNORMAL
ECHO BSA: 1.79 M2
EOSINOPHIL # BLD: 0.1 K/UL (ref 0–0.4)
EOSINOPHIL NFR BLD: 3 % (ref 0–5)
ERYTHROCYTE [DISTWIDTH] IN BLOOD BY AUTOMATED COUNT: 13.2 % (ref 11.6–14.5)
GLUCOSE BLD STRIP.AUTO-MCNC: 111 MG/DL (ref 70–110)
GLUCOSE SERPL-MCNC: 115 MG/DL (ref 74–99)
HCT VFR BLD AUTO: 39.2 % (ref 36–48)
HGB BLD-MCNC: 13.4 G/DL (ref 13–16)
IMM GRANULOCYTES # BLD AUTO: 0 K/UL (ref 0–0.04)
IMM GRANULOCYTES NFR BLD AUTO: 1 % (ref 0–0.5)
LYMPHOCYTES # BLD: 1.6 K/UL (ref 0.9–3.6)
LYMPHOCYTES NFR BLD: 41 % (ref 21–52)
MCH RBC QN AUTO: 27.7 PG (ref 24–34)
MCHC RBC AUTO-ENTMCNC: 34.2 G/DL (ref 31–37)
MCV RBC AUTO: 81.2 FL (ref 78–100)
MONOCYTES # BLD: 0.5 K/UL (ref 0.05–1.2)
MONOCYTES NFR BLD: 12 % (ref 3–10)
NEUTS SEG # BLD: 1.6 K/UL (ref 1.8–8)
NEUTS SEG NFR BLD: 42 % (ref 40–73)
NRBC # BLD: 0 K/UL (ref 0–0.01)
NRBC BLD-RTO: 0 PER 100 WBC
PLATELET # BLD AUTO: 329 K/UL (ref 135–420)
PMV BLD AUTO: 9.6 FL (ref 9.2–11.8)
POTASSIUM SERPL-SCNC: 4.1 MMOL/L (ref 3.5–5.5)
RBC # BLD AUTO: 4.83 M/UL (ref 4.35–5.65)
SODIUM SERPL-SCNC: 138 MMOL/L (ref 136–145)
WBC # BLD AUTO: 3.8 K/UL (ref 4.6–13.2)

## 2024-12-12 PROCEDURE — 94761 N-INVAS EAR/PLS OXIMETRY MLT: CPT

## 2024-12-12 PROCEDURE — C1713 ANCHOR/SCREW BN/BN,TIS/BN: HCPCS | Performed by: INTERNAL MEDICINE

## 2024-12-12 PROCEDURE — G0378 HOSPITAL OBSERVATION PER HR: HCPCS

## 2024-12-12 PROCEDURE — 2709999900 HC NON-CHARGEABLE SUPPLY: Performed by: INTERNAL MEDICINE

## 2024-12-12 PROCEDURE — 82962 GLUCOSE BLOOD TEST: CPT

## 2024-12-12 PROCEDURE — C1769 GUIDE WIRE: HCPCS | Performed by: INTERNAL MEDICINE

## 2024-12-12 PROCEDURE — 6370000000 HC RX 637 (ALT 250 FOR IP): Performed by: PHYSICIAN ASSISTANT

## 2024-12-12 PROCEDURE — 85025 COMPLETE CBC W/AUTO DIFF WBC: CPT

## 2024-12-12 PROCEDURE — C1894 INTRO/SHEATH, NON-LASER: HCPCS | Performed by: INTERNAL MEDICINE

## 2024-12-12 PROCEDURE — 7100000010 HC PHASE II RECOVERY - FIRST 15 MIN: Performed by: INTERNAL MEDICINE

## 2024-12-12 PROCEDURE — 93458 L HRT ARTERY/VENTRICLE ANGIO: CPT | Performed by: INTERNAL MEDICINE

## 2024-12-12 PROCEDURE — 80048 BASIC METABOLIC PNL TOTAL CA: CPT

## 2024-12-12 PROCEDURE — 76000 FLUOROSCOPY <1 HR PHYS/QHP: CPT | Performed by: INTERNAL MEDICINE

## 2024-12-12 PROCEDURE — 99152 MOD SED SAME PHYS/QHP 5/>YRS: CPT | Performed by: INTERNAL MEDICINE

## 2024-12-12 PROCEDURE — 6360000004 HC RX CONTRAST MEDICATION: Performed by: INTERNAL MEDICINE

## 2024-12-12 PROCEDURE — 6360000002 HC RX W HCPCS: Performed by: INTERNAL MEDICINE

## 2024-12-12 PROCEDURE — 2580000003 HC RX 258: Performed by: PHYSICIAN ASSISTANT

## 2024-12-12 PROCEDURE — 36415 COLL VENOUS BLD VENIPUNCTURE: CPT

## 2024-12-12 PROCEDURE — 6370000000 HC RX 637 (ALT 250 FOR IP): Performed by: STUDENT IN AN ORGANIZED HEALTH CARE EDUCATION/TRAINING PROGRAM

## 2024-12-12 PROCEDURE — 99232 SBSQ HOSP IP/OBS MODERATE 35: CPT | Performed by: INTERNAL MEDICINE

## 2024-12-12 RX ORDER — SODIUM CHLORIDE 0.9 % (FLUSH) 0.9 %
5-40 SYRINGE (ML) INJECTION EVERY 12 HOURS SCHEDULED
Status: DISCONTINUED | OUTPATIENT
Start: 2024-12-12 | End: 2024-12-12 | Stop reason: HOSPADM

## 2024-12-12 RX ORDER — HEPARIN SODIUM 1000 [USP'U]/ML
INJECTION, SOLUTION INTRAVENOUS; SUBCUTANEOUS PRN
Status: DISCONTINUED | OUTPATIENT
Start: 2024-12-12 | End: 2024-12-12 | Stop reason: HOSPADM

## 2024-12-12 RX ORDER — IOPAMIDOL 612 MG/ML
INJECTION, SOLUTION INTRAVASCULAR PRN
Status: DISCONTINUED | OUTPATIENT
Start: 2024-12-12 | End: 2024-12-12 | Stop reason: HOSPADM

## 2024-12-12 RX ORDER — FENTANYL CITRATE 50 UG/ML
INJECTION, SOLUTION INTRAMUSCULAR; INTRAVENOUS PRN
Status: DISCONTINUED | OUTPATIENT
Start: 2024-12-12 | End: 2024-12-12 | Stop reason: HOSPADM

## 2024-12-12 RX ORDER — SODIUM CHLORIDE 0.9 % (FLUSH) 0.9 %
5-40 SYRINGE (ML) INJECTION PRN
Status: DISCONTINUED | OUTPATIENT
Start: 2024-12-12 | End: 2024-12-12 | Stop reason: HOSPADM

## 2024-12-12 RX ORDER — SODIUM CHLORIDE 9 MG/ML
INJECTION, SOLUTION INTRAVENOUS CONTINUOUS
Status: DISCONTINUED | OUTPATIENT
Start: 2024-12-12 | End: 2024-12-12 | Stop reason: HOSPADM

## 2024-12-12 RX ORDER — OMEPRAZOLE 40 MG/1
40 CAPSULE, DELAYED RELEASE ORAL
Qty: 30 CAPSULE | Refills: 0 | Status: SHIPPED | OUTPATIENT
Start: 2024-12-12

## 2024-12-12 RX ORDER — SODIUM CHLORIDE 9 MG/ML
INJECTION, SOLUTION INTRAVENOUS PRN
Status: DISCONTINUED | OUTPATIENT
Start: 2024-12-12 | End: 2024-12-12 | Stop reason: HOSPADM

## 2024-12-12 RX ORDER — LIDOCAINE HYDROCHLORIDE 10 MG/ML
INJECTION, SOLUTION EPIDURAL; INFILTRATION; INTRACAUDAL; PERINEURAL PRN
Status: DISCONTINUED | OUTPATIENT
Start: 2024-12-12 | End: 2024-12-12 | Stop reason: HOSPADM

## 2024-12-12 RX ORDER — ACETAMINOPHEN 325 MG/1
650 TABLET ORAL EVERY 4 HOURS PRN
Status: DISCONTINUED | OUTPATIENT
Start: 2024-12-12 | End: 2024-12-12 | Stop reason: HOSPADM

## 2024-12-12 RX ORDER — MIDAZOLAM HYDROCHLORIDE 2 MG/2ML
INJECTION, SOLUTION INTRAMUSCULAR; INTRAVENOUS PRN
Status: DISCONTINUED | OUTPATIENT
Start: 2024-12-12 | End: 2024-12-12 | Stop reason: HOSPADM

## 2024-12-12 RX ADMIN — ROSUVASTATIN CALCIUM 20 MG: 20 TABLET, FILM COATED ORAL at 09:12

## 2024-12-12 RX ADMIN — CETIRIZINE HYDROCHLORIDE 5 MG: 10 TABLET, FILM COATED ORAL at 09:12

## 2024-12-12 RX ADMIN — TIMOLOL MALEATE 1 DROP: 5 SOLUTION OPHTHALMIC at 09:18

## 2024-12-12 RX ADMIN — ASPIRIN 81 MG: 81 TABLET, COATED ORAL at 09:13

## 2024-12-12 RX ADMIN — SODIUM CHLORIDE, PRESERVATIVE FREE 10 ML: 5 INJECTION INTRAVENOUS at 09:14

## 2024-12-12 RX ADMIN — PANTOPRAZOLE SODIUM 40 MG: 40 TABLET, DELAYED RELEASE ORAL at 09:13

## 2024-12-12 ASSESSMENT — PAIN SCALES - GENERAL
PAINLEVEL_OUTOF10: 0

## 2024-12-12 NOTE — CARE COORDINATION
Discharge order noted for today. Orders received. No needs identified at this time. Case management remains available as needed.    Patient's spouse to transport patient home at time of discharge.    Arielle DIALLON,RN, [facility]  Case Management  [phone]

## 2024-12-12 NOTE — PROGRESS NOTES
reviewed in detail.  Patient or healthcare power of  verbalized understanding and all questions were answered.  Monitor blood pressure, adjust antihypertensive medications as necessary.  Norvasc currently held, lisinopril currently held  Statin if can tolerate prior to discharge.  Continue Crestor 20 mg p.o. daily  Monitor fluid status, adjust as necessary, fluid restriction, salt intake <2g per day.  Recommend Guideline Directed medical therapy as can tolerate.  Continue with aspirin 81 mg p.o. daily    Thank you for this consultation and for allowing us to participate in this patient's care.    Primary cardiologist not applicable Dr. SELINA Maldonado consult    Subjective:     Denies chest pain  Denies shortness of breath  Denies abdominal pain    Objective:      Patient Vitals for the past 8 hrs:   Temp Pulse Resp BP SpO2   12/12/24 0815 97.5 °F (36.4 °C) 62 17 104/62 99 %   12/12/24 0500 -- 61 -- -- --   12/12/24 0408 97.5 °F (36.4 °C) 89 19 119/79 96 %   12/12/24 0300 -- 72 -- -- --         Patient Vitals for the past 96 hrs:   Weight   12/10/24 1515 67.6 kg (149 lb)   12/10/24 1315 68 kg (149 lb 14.6 oz)         Intake/Output Summary (Last 24 hours) at 12/12/2024 0916  Last data filed at 12/11/2024 1434  Gross per 24 hour   Intake 260 ml   Output --   Net 260 ml       EXAMINATION:  General:  Alert, cooperative, no distress  Head:  Normocephalic, without obvious abnormality, atraumatic.  Eyes:  Conjunctivae/corneas clear  Lungs:   Clear to auscultation bilaterally, no wheezes, no rales, no rhonchi  Heart:  Regular rate and rhythm, S1, S2 normal, no murmur, click, rub or gallop.  Abdomen:   Soft, non-tender. Bowel sounds normal.   Extremities: Extremities normal, no edema  Skin:  No rashes or lesions visible extremities  Neurologic:  Normal strength, sensation      Visit Vitals  /62   Pulse 62   Temp 97.5 °F (36.4 °C)   Resp 17   Ht 1.702 m (5' 7\")   Wt 67.6 kg (149 lb)   SpO2 99%   BMI 23.34 kg/m²

## 2024-12-12 NOTE — PROGRESS NOTES
4 Eyes Skin Assessment     NAME:  Katlyn Henry  YOB: 1945  MEDICAL RECORD NUMBER:  [id number]    The patient is being assessed for  Shift Handoff    I agree that at least one RN has performed a thorough Head to Toe Skin Assessment on the patient. ALL assessment sites listed below have been assessed.      Areas assessed by both nurses:    Head, Face, Ears, Shoulders, Back, Chest, Arms, Elbows, Hands, Sacrum. Buttock, Coccyx, Ischium, and Legs. Feet and Heels        Does the Patient have a Wound? No noted wound(s)       Liban Prevention initiated by RN: Yes  Wound Care Orders initiated by RN: No    Pressure Injury (Stage 3,4, Unstageable, DTI, NWPT, and Complex wounds) if present, place Wound referral order by RN under : No    New Ostomies, if present place, Ostomy referral order under : No     Nurse 1 eSignature: Electronically signed by Macy Vazquez RN on 12/12/24 at 8:03 AM EST    **SHARE this note so that the co-signing nurse can place an eSignature**    Nurse 2 eSignature: Electronically signed by Nisha Wade RN on 12/12/24 at 0810 AM EST

## 2024-12-12 NOTE — PROGRESS NOTES
(HUMALOG,ADMELOG) injection vial 0-4 Units  0-4 Units SubCUTAneous 4x Daily AC & HS    glucose chewable tablet 16 g  4 tablet Oral PRN    dextrose bolus 10% 125 mL  125 mL IntraVENous PRN    Or    dextrose bolus 10% 250 mL  250 mL IntraVENous PRN    glucagon injection 1 mg  1 mg SubCUTAneous PRN    dextrose 10 % infusion   IntraVENous Continuous PRN        Labs:    Recent Results (from the past 24 hour(s))   POCT Glucose    Collection Time: 12/11/24  4:56 PM   Result Value Ref Range    POC Glucose 104 70 - 110 mg/dL   POCT Glucose    Collection Time: 12/11/24  8:03 PM   Result Value Ref Range    POC Glucose 150 (H) 70 - 110 mg/dL   CBC with Auto Differential    Collection Time: 12/12/24  3:44 AM   Result Value Ref Range    WBC 3.8 (L) 4.6 - 13.2 K/uL    RBC 4.83 4.35 - 5.65 M/uL    Hemoglobin 13.4 13.0 - 16.0 g/dL    Hematocrit 39.2 36.0 - 48.0 %    MCV 81.2 78.0 - 100.0 FL    MCH 27.7 24.0 - 34.0 PG    MCHC 34.2 31.0 - 37.0 g/dL    RDW 13.2 11.6 - 14.5 %    Platelets 329 135 - 420 K/uL    MPV 9.6 9.2 - 11.8 FL    Nucleated RBCs 0.0 0  WBC    nRBC 0.00 0.00 - 0.01 K/uL    Neutrophils % 42 40 - 73 %    Lymphocytes % 41 21 - 52 %    Monocytes % 12 (H) 3 - 10 %    Eosinophils % 3 0 - 5 %    Basophils % 1 0 - 2 %    Immature Granulocytes % 1 (H) 0.0 - 0.5 %    Neutrophils Absolute 1.6 (L) 1.8 - 8.0 K/UL    Lymphocytes Absolute 1.6 0.9 - 3.6 K/UL    Monocytes Absolute 0.5 0.05 - 1.2 K/UL    Eosinophils Absolute 0.1 0.0 - 0.4 K/UL    Basophils Absolute 0.0 0.0 - 0.1 K/UL    Immature Granulocytes Absolute 0.0 0.00 - 0.04 K/UL    Differential Type AUTOMATED     Basic Metabolic Panel    Collection Time: 12/12/24  3:44 AM   Result Value Ref Range    Sodium 138 136 - 145 mmol/L    Potassium 4.1 3.5 - 5.5 mmol/L    Chloride 107 100 - 111 mmol/L    CO2 24 21 - 32 mmol/L    Anion Gap 7 3.0 - 18 mmol/L    Glucose 115 (H) 74 - 99 mg/dL    BUN 18 7.0 - 18 MG/DL    Creatinine 1.20 0.6 - 1.3 MG/DL    BUN/Creatinine Ratio 15 12  technique as appropriate to the performed exam, to include automated exposure control, adjustment of the mA and/or kV according to patient's size (including appropriate matching for site-specific examinations), or use of iterative reconstruction technique. FINDINGS: Lung Bases:  No airspace opacities bilaterally. Liver, Gallbladder, Adrenal Glands, Spleen, Pancreas:  Negative. Kidneys-Ureters-Bladder:  Unremarkable kidneys and ureters.  Underdistended urinary bladder.  Mild prostate enlargement. GI Tract:  Mild distal gastric wall thickening with edematous changes, potentially suggestive of gastritis.  No acute small bowel abnormalities.  The appendix is normal.  No acute colon abnormalities.  Mild diverticulosis coli. 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 Nodes:  No mesenteric or retroperitoneal adenopathy. Vascular:  No acute aortic abnormalities. Bones:  No lytic lesions or acute bony abnormalities.     1.  Distal gastric wall thickening/ edematous changes, potentially suggestive of gastritis. 2.  Large duodenal diverticulum. Electronically signed by Wagner Dunham    XR CHEST 1 VIEW    Result Date: 12/10/2024  XR CHEST 1 VIEW Indication: Fatigue Comparison: None Findings: Heart size is normal. The lungs are clear. No pleural effusion or pneumothorax. The visualized osseous structures are unremarkable.     1.  No acute cardiopulmonary process. Electronically signed by Satnam Isaac      Assessment/Plan:     Atypical angina  -Today patient is fine no chest pain  -Alert awake oriented  -Denies any new complaint  -Patient is going for cath today  -Further treatment change per catheter results    Versus pain secondary to gastritis; results of the Lexiscan myocardial perfusion stress test and abnormal.  Patient was found to have a partially reversible LV stress perfusion defect.  Cardiology input appreciated-discussed with Dr. Maldonado; keep the patient n.p.o. after midnight for cardiac

## 2024-12-12 NOTE — CARE COORDINATION
12/12/24 0937   IMM Letter   Observation Status Letter date given: 12/12/24   Observation Status Letter time given: 0905   Observation Status Letter given to Patient/Family/Significant other/Guardian/POA/by: Arielle DIALLON,RN, [facility]  Case Management  [phone]

## 2024-12-12 NOTE — PLAN OF CARE
Problem: Chronic Conditions and Co-morbidities  Goal: Patient's chronic conditions and co-morbidity symptoms are monitored and maintained or improved  12/12/2024 1451 by Nisha Wade RN  Outcome: Progressing  Flowsheets (Taken 12/12/2024 1451)  Care Plan - Patient's Chronic Conditions and Co-Morbidity Symptoms are Monitored and Maintained or Improved: Monitor and assess patient's chronic conditions and comorbid symptoms for stability, deterioration, or improvement  12/12/2024 1450 by Nisha Wade RN  Outcome: Progressing  12/12/2024 0231 by Macy Vazquez RN  Outcome: Progressing     Problem: Discharge Planning  Goal: Discharge to home or other facility with appropriate resources  12/12/2024 1451 by Nisha Wade RN  Outcome: Progressing  Flowsheets (Taken 12/12/2024 1451)  Discharge to home or other facility with appropriate resources: Identify barriers to discharge with patient and caregiver  12/12/2024 1450 by Nisha Wade RN  Outcome: Progressing  12/12/2024 0231 by Macy Vazquez RN  Outcome: Progressing     Problem: Safety - Adult  Goal: Free from fall injury  12/12/2024 1451 by Nisha Wade RN  Outcome: Progressing  12/12/2024 1450 by Nisha Wade RN  Outcome: Progressing  12/12/2024 0231 by Macy Vazquez RN  Outcome: Progressing     Problem: Pain  Goal: Verbalizes/displays adequate comfort level or baseline comfort level  12/12/2024 1451 by Nisha Wade RN  Outcome: Progressing  12/12/2024 1450 by Nisha Wade RN  Outcome: Progressing  12/12/2024 0231 by Macy Vazquez RN  Outcome: Progressing     Problem: Musculoskeletal - Adult  Goal: Return ADL status to a safe level of function  12/12/2024 1451 by Nisha Wade RN  Flowsheets (Taken 12/11/2024 1051)  Return ADL Status to a Safe Level of Function: Assist and instruct patient to increase activity and self care as tolerated  12/12/2024 1450 by Nisha Wade RN  Outcome: Progressing  12/12/2024 0231 by Macy Vazquez RN  Outcome:

## 2024-12-12 NOTE — DISCHARGE SUMMARY
[facility]    Discharge Summary    Patient: Katlyn Henry MRN: [id number]  CSN: [id number]    YOB: 1945  Age: 79 y.o.  Sex: male    DOA: 12/10/2024 LOS:  LOS: 0 days   Discharge Date:          Discharge Diagnoses:   1 Chest pain   2 LELE - Lisinopril was on hold   3 Acute Gastritis - Diarrhea   4 HTN - Now low BP - Norvasc on Hold   5 DM2 - on po Meds       Discharge Condition: Good    Discharge To: Home    Consults: Cardiology    HPI: per Admitting MD \" HPI: Katlyn Henry is a 79 y.o. male with a PMHx of DM, GERD, HLD, HTN who presented to the ED with c/o SOB with exertion and extreme fatigue that started this morning and was associated with nausea. For \"a while\" patient has been experiencing intermittent lower abdominal cramping that he thinks tends to occur around 3pm every day. He doesn't know anything that makes it better or worse. He's been having poor PO intake because of it. [facility] but had to cancel due to coming to the ER. He denies chest pain, cough, fever, vomiting, diarrhea, leg swelling.      In the ED, VSS. Labs with Cr 1.81 with a baseline of 1.2. Trop 10 x2. BNP 94. CXR negative. CTH negative for acute findings. CTAP showed distal gastric wall thickening/edematous changes potentially suggestive of gastritis and large duodenal diverticulum.     Hospital Course:     Admitted with chest pain and lower abd pain and nausea     Chest pain ACS ruled out - S/p Cath - Mild disease       Physical Exam:  General :NAD   HEENT :NAD   RS:Clear   CVS:Reg   Abd:Benign   Neuro :Intact   EXT: No Edema     Significant Diagnostic Studies:     Recent Results (from the past 24 hour(s))   POCT Glucose    Collection Time: 12/11/24  4:56 PM   Result Value Ref Range    POC Glucose 104 70 - 110 mg/dL   POCT Glucose    Collection Time: 12/11/24  8:03 PM   Result Value Ref Range    POC Glucose 150  to the vertex without IV contrast administration.  Axial, coronal and sagittal images are generated from the volumetric data set.  Dose optimization techniques are utilized as appropriate to the performed exam with combination of automated exposure control, adjustment of mA and/or kV according to patient size, and use of iterative reconstructive technique. FINDINGS: Brain: - Hemorrhage/ hematoma:  No acute intracranial hemorrhage/ hematoma. - Mass, mass effect:  None. - Gray-white matter differentiation:  Minimal periventricular white matter hypodensities are noted bilaterally, suggestive of chronic microvascular ischemic changes. - Interval assessment:  No significant interval change. CSF spaces:  No evidence of abnormal effacement or enlargement. Calvarium:  Intact. Sinuses, mastoids:  Mucosal thickening in the paranasal sinuses with mucous debris.     1.  No acute intracranial abnormalities. 2.  Minimal chronic microvascular white matter changes. 3.  Mucous debris and mucosal thickening in the paranasal sinuses. Electronically signed by Wagner Dunham    CT ABDOMEN PELVIS W IV CONTRAST Additional Contrast? Radiologist Recommendation    Result Date: 12/10/2024  EXAM:  CT Abdomen-Pelvis with Contrast. CLINICAL INDICATION:  Abdominal pain for 1 week with weight loss.  Dizziness and lightheadedness. COMPARISON: None. TECHNIQUE:  Helical volumetric CT imaging of the abdomen and pelvis is performed following IV contrast administration.  Coronal and sagittal multiplanar reconstruction images are generated for improved anatomic delineation.  All CT scans at this facility are performed using dose optimization technique as appropriate to the performed exam, to include automated exposure control, adjustment of the mA and/or kV according to patient's size (including appropriate matching for site-specific examinations), or use of iterative reconstruction technique. FINDINGS: Lung Bases:  No airspace opacities bilaterally. Liver,

## 2024-12-12 NOTE — PLAN OF CARE
Problem: Chronic Conditions and Co-morbidities  Goal: Patient's chronic conditions and co-morbidity symptoms are monitored and maintained or improved  Outcome: Progressing     Problem: Discharge Planning  Goal: Discharge to home or other facility with appropriate resources  Outcome: Progressing     Problem: Safety - Adult  Goal: Free from fall injury  Outcome: Progressing     Problem: Pain  Goal: Verbalizes/displays adequate comfort level or baseline comfort level  Outcome: Progressing     Problem: Musculoskeletal - Adult  Goal: Return ADL status to a safe level of function  Outcome: Progressing     Problem: Gastrointestinal - Adult  Goal: Maintains or returns to baseline bowel function  Outcome: Progressing  Goal: Maintains adequate nutritional intake  Outcome: Progressing      Patient Education     Well-Child Checkup: 11 to 13 Years     Physical activity is key to lifelong good health. Encourage your child to find activities that he or she enjoys.   Between ages 11 and 13, your child will grow and change a lot. It’s important to keep having yearly checkups so the healthcare provider can track this progress. As your child enters puberty, he or she may become more embarrassed about having a checkup. Reassure your child that the exam is normal and necessary. Be aware that the healthcare provider may ask to talk with the child without you in the exam room.   School and social issues  Here are some topics you, your child, and the healthcare provider may want to discuss during this visit:   · School performance. How is your child doing in school? Is homework finished on time? Does your child stay organized? These are skills you can help with. Keep in mind that a drop in school performance can be a sign of other problems.  · Friendships. Do you like your child’s friends? Do the friendships seem healthy? Make sure to talk to your child about who his or her friends are and how they spend time together. This is the age when peer pressure can start to be a problem.  · Life at home. How is your child’s behavior? Does he or she get along with others in the family? Is he or she respectful of you, other adults, and authority? Does your child participate in family events, or does he or she withdraw from other family members?  · Risky behaviors. It’s not too early to start talking to your child about drugs, alcohol, smoking, and sex. Make sure your child understands that these are not activities he or she should do, even if friends are. Answer your child’s questions, and don’t be afraid to ask questions of your own. Make sure your child knows he or she can always come to you for help. If you’re not sure how to approach these topics, talk to the healthcare provider for advice.  Entering puberty  Puberty is  the stage when a child begins to develop sexually into an adult. It usually starts between 9 and 14 for girls, and between 12 and 16 for boys. Here is some of what you can expect when puberty begins:   · Acne and body odor. Hormones that increase during puberty can cause acne (pimples) on the face and body. Hormones can also increase sweating and cause a stronger body odor. At this age, your child should begin to shower or bathe daily. Encourage your child to use deodorant and acne products as needed.  · Body changes in girls. Early in puberty, breasts begin to develop. One breast often starts to grow before the other. This is normal. Hair begins to grow in the pubic area, under the arms, and on the legs. Around 2 years after breasts begin to grow, a girl will start having monthly periods (menstruation). To help prepare your daughter for this change, talk to her about periods, what to expect, and how to use feminine products.  · Body changes in boys. At the start of puberty, the testicles drop lower and the scrotum darkens and becomes looser. Hair begins to grow in the pubic area, under the arms, and on the legs, chest, and face. The voice changes, becoming lower and deeper. As the penis grows and matures, erections and “wet dreams” begin to happen. Reassure your son that this is normal.  · Emotional changes. Along with these physical changes, you’ll likely notice changes in your child’s personality. You may notice your child developing an interest in dating and becoming “more than friends” with others. Also, many kids become ford and develop an attitude around puberty. This can be frustrating, but it is very normal. Try to be patient and consistent. Encourage conversations, even when your child doesn’t seem to want to talk. No matter how your child acts, he or she still needs a parent.  Nutrition and exercise tips  Today, kids are less active and eat more junk food than ever before. Your child is starting to make  choices about what to eat and how active to be. You can’t always have the final say, but you can help your child develop healthy habits. Here are some tips:   · Help your child get at least 30 to 60 minutes of activity every day. The time can be broken up throughout the day. If the weather’s bad or you’re worried about safety, find supervised indoor activities.   · Limit “screen time” to 1 hour each day. This includes time spent watching TV, playing video games, using the computer, and texting. If your child has a TV, computer, or video game console in the bedroom, consider replacing it with a music player. For many kids, dancing and singing are fun ways to get moving.  · Limit sugary drinks. Soda, juice, and sports drinks lead to unhealthy weight gain and tooth decay. Water and low-fat or nonfat milk are best to drink. In moderation (no more than 8 to 12 ounces daily), 100% fruit juice is OK. Save soda and other sugary drinks for special occasions.  · Have at least one family meal together each day. Busy schedules often limit time for sitting and talking. Sitting and eating together allows for family time. It also lets you see what and how your child eats.  · Pay attention to portions. Serve portions that make sense for your kids. Let them stop eating when they’re full--don’t make them clean their plates. Be aware that many kids’ appetites increase during puberty. If your child is still hungry after a meal, offer seconds of vegetables or fruit.  · Serve and encourage healthy foods. Your child is making more food decisions on his or her own. All foods have a place in a balanced diet. Fruits, vegetables, lean meats, and whole grains should be eaten every day. Save less healthy foods--like french fries, candy, and chips--for a special occasion. When your child does choose to eat junk food, consider making the child buy it with his or her own money. Ask your child to tell you when he or she buys junk food or swaps food  with friends.  · Bring your child to the dentist at least twice a year for teeth cleaning and a checkup.  Sleeping tips  At this age, your child needs about 10 hours of sleep each night. Here are some tips:   · Set a bedtime and make sure your child follows it each night.  · TV, computer, and video games can agitate a child and make it hard to calm down for the night. Turn them off at least an hour before bed. Instead, encourage your child to read before bed.  · If your child has a cell phone, make sure it’s turned off at night.  · Don’t let your child go to sleep very late or sleep in on weekends. This can disrupt sleep patterns and make it harder to sleep on school nights.  · Remind your child to brush and floss his or her teeth before bed. Briefly supervise your child's dental self-care once a week to make sure of proper technique.  Safety tips  Recommendations for keeping your child safe include the following:   · When riding a bike, roller-skating, or using a scooter or skateboard, your child should wear a helmet with the strap fastened. When using roller skates, a scooter, or a skateboard, it is also a good idea for your child to wear wrist guards, elbow pads, and knee pads.  · In the car, all children younger than 13 should sit in the back seat. Children shorter than 4'9\" (57 inches) should continue to use a booster seat to properly position the seat belt.  · If your child has a cell phone or portable music player, make sure these are used safely and responsibly. Do not allow your child to talk on the phone, text, or listen to music with headphones while he or she is riding a bike or walking outdoors. Remind your child to pay special attention when crossing the street.  · Constant loud music can cause hearing damage, so monitor the volume on your child’s music player. Many players let you set a limit for how loud the volume can be turned up. Check the directions for details.  · At this age, kids may start  taking risks that could be dangerous to their health or well-being. Sometimes bad decisions stem from peer pressure. Other times, kids just don’t think ahead about what could happen. Teach your child the importance of making good decisions. Talk about how to recognize peer pressure and come up with strategies for coping with it.  · Sudden changes in your child’s mood, behavior, friendships, or activities can be warning signs of problems at school or in other aspects of your child’s life. If you notice signs like these, talk to your child and to the staff at your child’s school. The healthcare provider may also be able to offer advice.  Vaccines  Based on recommendations from the American Association of Pediatrics, at this visit your child may receive the following vaccines:   · Human papillomavirus (HPV) (ages 11 to 12)  · Influenza (flu), annually  · Meningococcal (ages 11 to 12)  · Tetanus, diphtheria, and pertussis (ages 11 to 12)  Stay on top of social media  In this wired age, kids are much more “connected” with friends--possibly some they’ve never met in person. To teach your child how to use social media responsibly:   · Set limits for the use of cell phones, the computer, and the Internet. Remind your child that you can check the web browser history and cell phone logs to know how these devices are being used. Use parental controls and passwords to block access to inappropriate websites. Use privacy settings on websites so only your child’s friends can view his or her profile.  · Explain to your child the dangers of giving out personal information online. Teach your child not to share his or her phone number, address, picture, or other personal details with online friends without your permission.  · Make sure your child understands that things he or she “says” on the Internet are never private. Posts made on websites like Facebook, enrich-in, and [employer] can be seen by people they weren’t intended for. Posts  can easily be misunderstood and can even cause trouble for you or your child. Supervise your child’s use of social networks, chat rooms, and email.  Ameena last reviewed this educational content on 4/1/2020  © 9643-2772 [employer]. All rights reserved. This information is not intended as a substitute for professional medical care. Always follow your healthcare professional's instructions.

## 2024-12-12 NOTE — PROGRESS NOTES
Cath holding summary:    0900: Nurse handoff report received from KEY Cameron on Katlyn Henry.    1040: Patient transported from inpatient area without difficulty, placed on monitor NSR. A&O x 4, no c/o pain. NPO since midnight, ID and allergies verified. H&P reviewed, med rec completed. PIV x1 inserted, 1 in place. Groin prep completed, consent ready for signature.    1430: Verbal report given to KEY Hoang on [facility]. Report consisted of patient's Situation, Background, Assessment and Recommendations (SBAR). Information from the following report(s) Nurse Handoff Report, Adult Overview, and MAR was reviewed with the receiving nurse. Opportunity for questions and clarification was provided.    1511: Verbal report received from AZEEM Brito on [facility] of patient care. Report consisted of patient's Situation, Background, Assessment and Recommendations (SBAR). Information from the following report(s) Nurse Handoff Report, Adult Overview, Surgery Report, and MAR was reviewed with the receiving nurse. Pt A&O x 4, no c/o pain. Opportunity for questions and clarification provided.  Procedure: Cardiac Cath  Intervention: No  Site: Right, Arm    1600: R band removed from right wrist per protocol. No bleeding or hematoma noted, site covered with hemostatic dressing and tegaderm. Patient denies pain, pulse palpable and brisk cap refill distal to site.  Cath site instructions and post care including s/s of bleeding and methods of bleeding prevention reviewed with patient who verbalized understanding.    1615: Verbal report given to KEY Cameron on Katlyn Henry  being transferred to inpatient room for routine progression of patient care. Report consisted of patient's Situation, Background, Assessment and Recommendations (SBAR). Information from the following report(s) Nurse Handoff Report, Adult Overview, Surgery

## 2024-12-13 ENCOUNTER — CLINICAL DOCUMENTATION (OUTPATIENT)
Facility: CLINIC | Age: 79
End: 2024-12-13

## 2024-12-13 ENCOUNTER — TELEPHONE (OUTPATIENT)
Facility: CLINIC | Age: 79
End: 2024-12-13

## 2024-12-13 NOTE — PROGRESS NOTES
PATIENT REQUIRES TCM OUTREACH    MRN: [id number]     PATIENT:  Katlyn Henry    ADMIT DATE:  12/10/2024     DISCHARGE DATE:  12/12/24     ADMITTING DX: SOB; chest/abdominal pain    MEDICATION CHANGES:   Start: Prilosec  Stop: Proventil, Norvasc, Colace, Hydrodiuril, lisinopril, eodolac, Miralax, Viagra  Change:    SPECIALISTS:   cardiology    HOME HEALTH/WOUND CARE/PT/OT:  none    SCHEDULED FOLLOW UP APPTS:    Future Appointments   Date Time Provider Department Center   5/21/2025  7:45 AM IOC LAB VISIT [facility]   5/27/2025 10:00 AM Huong Palomino DNP [facility]       *Please contact patient within 2 business days and document under .TCMOFFICE.  A scheduled Hospital Follow-up for patient within 7 days is preferred*

## 2024-12-13 NOTE — TELEPHONE ENCOUNTER
Care Transitions Initial Follow Up Call    Outreach made within 2 business days of discharge: Yes    Patient: Katlyn Henry Patient : 1945   MRN: [id number]  Reason for Admission: Shortness of Breathe    Discharge Date: 24       Spoke with: Patient    Discharge department/facility: [facility] Interactive Patient Contact:  Was patient able to fill all prescriptions: Yes  Was patient instructed to bring all medications to the follow-up visit: Yes  Is patient taking all medications as directed in the discharge summary? Yes  Does patient understand their discharge instructions: Yes  Does patient have questions or concerns that need addressed prior to 7-14 day follow up office visit: no    Additional needs identified to be addressed with provider  No needs identified             Scheduled appointment with PCP within 7-14 days    Follow Up  Future Appointments   Date Time Provider Department Center   2024 11:00 AM Huong Palomino DNP [facility]   2025  7:45 AM [facility] LAB VISIT [facility]   2025 10:00 AM Huong Palomino DNP [facility]       Sarah Pappas MA

## 2024-12-24 ENCOUNTER — OFFICE VISIT (OUTPATIENT)
Facility: CLINIC | Age: 79
End: 2024-12-24
Payer: MEDICARE

## 2024-12-24 VITALS
BODY MASS INDEX: 23.39 KG/M2 | WEIGHT: 149 LBS | SYSTOLIC BLOOD PRESSURE: 132 MMHG | RESPIRATION RATE: 16 BRPM | OXYGEN SATURATION: 96 % | HEART RATE: 65 BPM | HEIGHT: 67 IN | DIASTOLIC BLOOD PRESSURE: 74 MMHG | TEMPERATURE: 97.3 F

## 2024-12-24 DIAGNOSIS — K57.90 DIVERTICULOSIS: ICD-10-CM

## 2024-12-24 DIAGNOSIS — R00.1 BRADYCARDIA: ICD-10-CM

## 2024-12-24 DIAGNOSIS — R06.02 SHORTNESS OF BREATH: ICD-10-CM

## 2024-12-24 DIAGNOSIS — J30.89 ENVIRONMENTAL AND SEASONAL ALLERGIES: ICD-10-CM

## 2024-12-24 DIAGNOSIS — I25.10 MILD CORONARY ARTERY DISEASE: ICD-10-CM

## 2024-12-24 DIAGNOSIS — I51.89 GRADE I DIASTOLIC DYSFUNCTION: Primary | ICD-10-CM

## 2024-12-24 DIAGNOSIS — Z87.891 FORMER SMOKER: ICD-10-CM

## 2024-12-24 DIAGNOSIS — K29.00 ACUTE SUPERFICIAL GASTRITIS WITHOUT HEMORRHAGE: ICD-10-CM

## 2024-12-24 DIAGNOSIS — M70.42 PREPATELLAR BURSITIS OF LEFT KNEE: ICD-10-CM

## 2024-12-24 PROCEDURE — 1125F AMNT PAIN NOTED PAIN PRSNT: CPT | Performed by: NURSE PRACTITIONER

## 2024-12-24 PROCEDURE — 3078F DIAST BP <80 MM HG: CPT | Performed by: NURSE PRACTITIONER

## 2024-12-24 PROCEDURE — 1159F MED LIST DOCD IN RCRD: CPT | Performed by: NURSE PRACTITIONER

## 2024-12-24 PROCEDURE — 1123F ACP DISCUSS/DSCN MKR DOCD: CPT | Performed by: NURSE PRACTITIONER

## 2024-12-24 PROCEDURE — 3075F SYST BP GE 130 - 139MM HG: CPT | Performed by: NURSE PRACTITIONER

## 2024-12-24 PROCEDURE — 99215 OFFICE O/P EST HI 40 MIN: CPT | Performed by: NURSE PRACTITIONER

## 2024-12-24 RX ORDER — ALBUTEROL SULFATE 90 UG/1
2 INHALANT RESPIRATORY (INHALATION) EVERY 6 HOURS PRN
Qty: 1 EACH | Refills: 5 | Status: SHIPPED | OUTPATIENT
Start: 2024-12-24

## 2024-12-24 NOTE — PROGRESS NOTES
Katlyn Henry is a 79 y.o. male (: 1945) presenting to address:    Chief Complaint   Patient presents with    Follow-Up from Hospital       Vitals:    24 1056   BP: 132/74   Pulse: 65   Resp: 16   Temp: 97.3 °F (36.3 °C)   SpO2: 96%       \"Have you been to the ER, urgent care clinic since your last visit?  Hospitalized since your last visit?\"    YES - When: approximately 2  weeks ago.  Where and Why: Shortness of breath.    “Have you seen or consulted any other health care providers outside of [facility] since your last visit?”    NO             
LAB     Allergies and Intolerances:   Allergies   Allergen Reactions    Other      Other reaction(s): nasal symptoms     Family History:   No family history on file.  Social History:   He  reports that he quit smoking about 26 years ago. His smoking use included cigarettes. He has never used smokeless tobacco.   Social History     Substance and Sexual Activity   Alcohol Use Not Currently     Immunization History:  Immunization History   Administered Date(s) Administered    COVID-19, MODERNA BLUE border, Primary or Immunocompromised, (age 12y+), IM, 100 mcg/0.5mL 02/25/2021, 03/25/2021, 12/06/2021    COVID-19, MODERNA Bivalent, (age 12y+), IM, 50 mcg/0.5 mL 10/12/2022    COVID-19, MODERNA, (age 12y+), IM, 50mcg/0.5mL 10/09/2023    Influenza Virus Vaccine 11/01/2001, 10/01/2002, 11/03/2003, 10/15/2007, 10/20/2008, 10/26/2009, 10/29/2010, 09/26/2011, 10/21/2013, 11/06/2014, 09/04/2016, 10/27/2017, 10/15/2019    Influenza, FLUAD, (age 65 y+), IM, Quadv, 0.5mL 10/13/2021    Influenza, FLUCELVAX, (age 6 mo+), MDCK, Quadv PF, 0.5mL 11/01/2018, 11/29/2022    Influenza, FLUZONE High Dose (age 65 y+), IM, Quadv, 0.7mL 09/28/2023    Influenza, FLUZONE High Dose, (age 65 y+), IM, Trivalent PF, 0.5mL 10/04/2015    Pneumococcal Vaccine 01/14/2008    Pneumococcal, PCV-13, PREVNAR 13, (age 6w+), IM, 0.5mL 05/09/2017    Pneumococcal, PPSV23, PNEUMOVAX 23, (age 2y+), SC/IM, 0.5mL 01/31/2023    TDaP, ADACEL (age 10y-64y), BOOSTRIX (age 10y+), IM, 0.5mL 09/11/2014, 11/01/2014         Return if symptoms worsen or fail to improve.      Dr. Huong Palomino, MORIAHP-C, DNP  [facility]     The patient (or guardian, if applicable) and other individuals in attendance with the patient were advised that Artificial Intelligence will be utilized during this visit to record and process the conversation to generate a clinical note. The patient (or guardian, if applicable) and other individuals in attendance at the appointment consented

## 2025-01-02 PROBLEM — K57.90 DIVERTICULOSIS: Status: ACTIVE | Noted: 2025-01-02

## 2025-01-02 PROBLEM — I25.10 MILD CORONARY ARTERY DISEASE: Status: ACTIVE | Noted: 2025-01-02

## 2025-01-02 PROBLEM — K29.00 ACUTE SUPERFICIAL GASTRITIS WITHOUT HEMORRHAGE: Status: ACTIVE | Noted: 2025-01-02

## 2025-01-02 PROBLEM — I50.30 (HFPEF) HEART FAILURE WITH PRESERVED EJECTION FRACTION (HCC): Status: RESOLVED | Noted: 2024-12-11 | Resolved: 2025-01-02

## 2025-01-02 PROBLEM — R00.1 BRADYCARDIA: Status: ACTIVE | Noted: 2025-01-02

## 2025-01-02 PROBLEM — I20.89 ATYPICAL ANGINA (HCC): Status: RESOLVED | Noted: 2024-12-11 | Resolved: 2025-01-02

## 2025-01-02 PROBLEM — N17.9 AKI (ACUTE KIDNEY INJURY) (HCC): Status: RESOLVED | Noted: 2024-12-10 | Resolved: 2025-01-02

## 2025-01-02 PROBLEM — M70.42 PREPATELLAR BURSITIS OF LEFT KNEE: Status: ACTIVE | Noted: 2025-01-02

## 2025-01-02 NOTE — ASSESSMENT & PLAN NOTE
A CT scan of his abdomen and pelvis revealed distal gastric wall thickening and edematous changes, indicative of gastritis. He should continue his omeprazole treatment as it has been effective in managing his symptoms.    Pt is asymptomatic today

## 2025-01-02 NOTE — ASSESSMENT & PLAN NOTE
Echocardiogram shows grade 1 diastolic dysfunction.     New Dx.  This condition is not uncommon for his age of 79 years. It does not require treatment unless it becomes symptomatic, which would indicate a progression of the disease. He should monitor for symptoms such as chest pain, ankle swelling, and severe shortness of breath.

## 2025-01-02 NOTE — ASSESSMENT & PLAN NOTE
He can continue using albuterol sulfate, 2 puffs every 6 hours as needed, or every 4 hours if necessary, for shortness of breath.    Former smoker    Cxr neg

## 2025-01-02 NOTE — ASSESSMENT & PLAN NOTE
He has a history of smoking and plaque buildup in his arteries. He is advised to reduce the intake of fatty and fried foods and maintain a diet rich in baked and boiled foods. Regular exercise, such as walking, is recommended to promote cardiovascular health. He should continue his daily aspirin regimen and his current dose of rosuvastatin (Crestor) 20 mg.    PROCEDURE  Cardiac catheterization revealed mild arterial thickening but overall good flow.

## 2025-01-02 NOTE — ASSESSMENT & PLAN NOTE
Well-controlled, continue current medications   Continue Fl[facility] as [facility]  Avoid allergens (strong smells, animals, cigarettes or carpeted areas)  Avoid going outside when the pollen is heavy

## 2025-01-02 NOTE — ASSESSMENT & PLAN NOTE
He has been experiencing left knee swelling and was diagnosed with bursitis. He is advised to use over-the-counter diclofenac gel (Voltaren) for pain relief. He should also consider using a neoprene knee brace when active, but not while resting or sleeping. If these measures do not alleviate his symptoms, a referral to physical therapy will be considered.

## 2025-01-02 NOTE — ASSESSMENT & PLAN NOTE
A large diverticulum was identified in his intestines. He is advised to avoid peanuts and seeds, or ensure they are thoroughly chewed before swallowing, to prevent them from getting caught in the diverticulum leading to inflammation.

## 2025-01-02 NOTE — ASSESSMENT & PLAN NOTE
He was diagnosed with bradycardia during his hospital stay. A referral to a cardiologist will be made for further evaluation and management. He should follow up with Dr Bennett, cardiologist who performed his heart catheterization at least once a year.

## 2025-01-09 ENCOUNTER — OFFICE VISIT (OUTPATIENT)
Age: 80
End: 2025-01-09
Payer: MEDICARE

## 2025-01-09 VITALS
OXYGEN SATURATION: 98 % | HEIGHT: 67 IN | WEIGHT: 144 LBS | HEART RATE: 53 BPM | SYSTOLIC BLOOD PRESSURE: 163 MMHG | DIASTOLIC BLOOD PRESSURE: 79 MMHG | BODY MASS INDEX: 22.6 KG/M2

## 2025-01-09 DIAGNOSIS — I10 PRIMARY HYPERTENSION: ICD-10-CM

## 2025-01-09 DIAGNOSIS — E78.2 MIXED HYPERLIPIDEMIA: ICD-10-CM

## 2025-01-09 DIAGNOSIS — I25.10 CORONARY ARTERY DISEASE INVOLVING NATIVE CORONARY ARTERY OF NATIVE HEART WITHOUT ANGINA PECTORIS: Primary | ICD-10-CM

## 2025-01-09 DIAGNOSIS — E11.21 TYPE 2 DIABETES MELLITUS WITH DIABETIC NEPHROPATHY, WITHOUT LONG-TERM CURRENT USE OF INSULIN (HCC): ICD-10-CM

## 2025-01-09 DIAGNOSIS — I51.89 GRADE I DIASTOLIC DYSFUNCTION: ICD-10-CM

## 2025-01-09 DIAGNOSIS — G47.33 OBSTRUCTIVE SLEEP APNEA (ADULT) (PEDIATRIC): ICD-10-CM

## 2025-01-09 PROBLEM — Z86.79 HISTORY OF HYPERTENSION: Status: RESOLVED | Noted: 2024-12-12 | Resolved: 2025-01-09

## 2025-01-09 PROCEDURE — 99214 OFFICE O/P EST MOD 30 MIN: CPT | Performed by: INTERNAL MEDICINE

## 2025-01-09 PROCEDURE — 1159F MED LIST DOCD IN RCRD: CPT | Performed by: INTERNAL MEDICINE

## 2025-01-09 PROCEDURE — 3078F DIAST BP <80 MM HG: CPT | Performed by: INTERNAL MEDICINE

## 2025-01-09 PROCEDURE — 3077F SYST BP >= 140 MM HG: CPT | Performed by: INTERNAL MEDICINE

## 2025-01-09 PROCEDURE — 1123F ACP DISCUSS/DSCN MKR DOCD: CPT | Performed by: INTERNAL MEDICINE

## 2025-01-09 PROCEDURE — 1160F RVW MEDS BY RX/DR IN RCRD: CPT | Performed by: INTERNAL MEDICINE

## 2025-01-09 RX ORDER — LISINOPRIL 40 MG/1
40 TABLET ORAL DAILY
COMMUNITY

## 2025-01-09 RX ORDER — ETODOLAC 400 MG/1
400 TABLET, FILM COATED ORAL AS NEEDED
COMMUNITY

## 2025-01-09 RX ORDER — DOCUSATE SODIUM 100 MG/1
100 CAPSULE, LIQUID FILLED ORAL AS NEEDED
COMMUNITY

## 2025-01-09 RX ORDER — ROSUVASTATIN CALCIUM 40 MG/1
40 TABLET, COATED ORAL DAILY
Qty: 90 TABLET | Refills: 3 | Status: SHIPPED | OUTPATIENT
Start: 2025-01-09

## 2025-01-09 RX ORDER — HYDROCHLOROTHIAZIDE 25 MG/1
25 TABLET ORAL DAILY
COMMUNITY

## 2025-01-09 RX ORDER — SILDENAFIL 100 MG/1
100 TABLET, FILM COATED ORAL PRN
COMMUNITY

## 2025-01-09 ASSESSMENT — ENCOUNTER SYMPTOMS
EYES NEGATIVE: 1
GASTROINTESTINAL NEGATIVE: 1
SHORTNESS OF BREATH: 1

## 2025-01-09 NOTE — PROGRESS NOTES
Katlyn Henry is a 79 y.o. year old male.    1/9/2025 seen as a new patient in the office.  Recent admission in December 24 with low blood pressure, abnormal EKG and reduced responsiveness.  Blood pressure medications held due to LELE and low BP and that admission.  Dyspnea on exertion going up 1 flight of steps.  No chest pain edema dizziness or palpitations.  I had cardiac workup in the hospital showing normal ejection fraction, grade 1 diastolic dysfunction, noncritical coronary disease.          Review of Systems   Constitutional: Negative.    HENT: Negative.     Eyes: Negative.    Respiratory:  Positive for shortness of breath.    Cardiovascular: Negative.    Gastrointestinal: Negative.    Endocrine: Negative.    Genitourinary: Negative.    Musculoskeletal: Negative.    Neurological: Negative.    Psychiatric/Behavioral: Negative.     All other systems reviewed and are negative.        Physical Exam  Vitals and nursing note reviewed.   Constitutional:       Appearance: Normal appearance.   HENT:      Head: Normocephalic and atraumatic.      Nose: Nose normal.   Eyes:      Conjunctiva/sclera: Conjunctivae normal.   Cardiovascular:      Rate and Rhythm: Normal rate and regular rhythm.      Pulses: Normal pulses.      Heart sounds: Normal heart sounds.   Pulmonary:      Effort: Pulmonary effort is normal.      Breath sounds: Normal breath sounds.   Abdominal:      General: Bowel sounds are normal.      Palpations: Abdomen is soft.   Musculoskeletal:         General: Normal range of motion.      Right lower leg: No edema.      Left lower leg: No edema.   Skin:     General: Skin is warm and dry.   Neurological:      General: No focal deficit present.      Mental Status: He is alert and oriented to person, place, and time.   Psychiatric:         Mood and Affect: Mood normal.         Behavior: Behavior normal.                  12/10/24    ECHO (TTE) COMPLETE (PRN CONTRAST/BUBBLE/STRAIN/3D) 12/10/2024  3:27 PM

## 2025-01-09 NOTE — PATIENT INSTRUCTIONS
The medication list included in this document is our record of what you are currently taking, including any changes that were made at today's visit.  If you find any differences when compared to your medications at home, or have any questions that were not answered at your visit, please contact the office.    Check blood pressure frequently at home at least 2-3 times a week and it should stay less than 130/80 at home.  If it is persistently higher, please call us.

## 2025-03-30 DIAGNOSIS — I10 PRIMARY HYPERTENSION: ICD-10-CM

## 2025-03-30 DIAGNOSIS — E78.2 MIXED HYPERLIPIDEMIA: ICD-10-CM

## 2025-05-21 ENCOUNTER — HOSPITAL ENCOUNTER (OUTPATIENT)
Facility: HOSPITAL | Age: 80
Setting detail: SPECIMEN
Discharge: HOME | End: 2025-05-24

## 2025-05-21 ENCOUNTER — LAB (OUTPATIENT)
Facility: CLINIC | Age: 80
End: 2025-05-21

## 2025-05-21 DIAGNOSIS — E78.2 MIXED HYPERLIPIDEMIA: ICD-10-CM

## 2025-05-21 DIAGNOSIS — E11.21 TYPE 2 DIABETES MELLITUS WITH DIABETIC NEPHROPATHY, WITHOUT LONG-TERM CURRENT USE OF INSULIN (HCC): ICD-10-CM

## 2025-05-21 DIAGNOSIS — I10 PRIMARY HYPERTENSION: ICD-10-CM

## 2025-05-21 DIAGNOSIS — Z12.5 SPECIAL SCREENING FOR MALIGNANT NEOPLASM OF PROSTATE: ICD-10-CM

## 2025-05-21 PROCEDURE — 99001 SPECIMEN HANDLING PT-LAB: CPT

## 2025-05-22 LAB
A/G RATIO: 2 RATIO (ref 1.1–2.6)
ALBUMIN: 4.2 G/DL (ref 3.5–5)
ALP BLD-CCNC: 48 U/L (ref 40–125)
ALT SERPL-CCNC: 19 U/L (ref 5–40)
ANION GAP SERPL CALCULATED.3IONS-SCNC: 13 MMOL/L (ref 3–15)
AST SERPL-CCNC: 27 U/L (ref 10–37)
BASOPHILS # BLD: 2 % (ref 0–2)
BASOPHILS ABSOLUTE: 0.1 K/UL (ref 0–0.2)
BILIRUB SERPL-MCNC: 0.7 MG/DL (ref 0.2–1.2)
BUN BLDV-MCNC: 12 MG/DL (ref 6–22)
CALCIUM SERPL-MCNC: 9.4 MG/DL (ref 8.4–10.5)
CHLORIDE BLD-SCNC: 104 MMOL/L (ref 98–110)
CHOLESTEROL, TOTAL: 181 MG/DL (ref 110–200)
CHOLESTEROL/HDL RATIO: 4.9 (ref 0–5)
CO2: 24 MMOL/L (ref 20–32)
CREAT SERPL-MCNC: 1.1 MG/DL (ref 0.8–1.6)
CREATININE, URINE  MG/DL: 48 MG/DL
EOSINOPHIL # BLD: 5 % (ref 0–6)
EOSINOPHILS ABSOLUTE: 0.2 K/UL (ref 0–0.5)
ESTIMATED AVERAGE GLUCOSE: 146 MG/DL (ref 91–123)
GFR, ESTIMATED: 65.7 ML/MIN/1.73 SQ.M.
GLOBULIN: 2.1 G/DL (ref 2–4)
GLUCOSE: 112 MG/DL (ref 70–99)
HBA1C MFR BLD: 6.7 % (ref 4.8–5.6)
HCT VFR BLD CALC: 38.7 % (ref 37.8–52.2)
HDLC SERPL-MCNC: 37 MG/DL
HEMOGLOBIN: 12.2 G/DL (ref 12.6–17.1)
LDL CHOLESTEROL: 128 MG/DL (ref 50–99)
LDL/HDL RATIO: 3.5
LYMPHOCYTES # BLD: 40 % (ref 20–45)
LYMPHOCYTES ABSOLUTE: 1.5 K/UL (ref 1–4.8)
MCH RBC QN AUTO: 26 PG (ref 26–34)
MCHC RBC AUTO-ENTMCNC: 32 G/DL (ref 31–36)
MCV RBC AUTO: 83 FL (ref 80–95)
MICROALBUMIN/CREAT 24H UR: 12.8 MG/L (ref 0.1–17)
MICROALBUMIN/CREAT UR-RTO: 26.7 (ref 0–30)
MONOCYTES ABSOLUTE: 0.5 K/UL (ref 0.1–1)
MONOCYTES: 12 % (ref 3–12)
NEUTROPHILS ABSOLUTE: 1.6 K/UL (ref 1.8–7.7)
NEUTROPHILS SEGMENTED: 41 % (ref 40–75)
NON-HDL CHOLESTEROL: 144 MG/DL
PDW BLD-RTO: 15.7 % (ref 10–15.5)
PLATELET # BLD: 245 K/UL (ref 140–440)
PMV BLD AUTO: 10.3 FL (ref 9–13)
POTASSIUM SERPL-SCNC: 4.3 MMOL/L (ref 3.5–5.5)
RBC # BLD: 4.64 M/UL (ref 3.8–5.8)
SCREENING PSA: 1.34 NG/ML
SODIUM BLD-SCNC: 141 MMOL/L (ref 133–145)
TOTAL PROTEIN: 6.3 G/DL (ref 6.2–8.1)
TRIGL SERPL-MCNC: 77 MG/DL (ref 40–149)
VLDLC SERPL CALC-MCNC: 15 MG/DL (ref 8–30)
WBC # BLD: 3.8 K/UL (ref 4–11)

## 2025-05-27 ENCOUNTER — RESULTS FOLLOW-UP (OUTPATIENT)
Facility: CLINIC | Age: 80
End: 2025-05-27

## 2025-05-27 ENCOUNTER — OFFICE VISIT (OUTPATIENT)
Facility: CLINIC | Age: 80
End: 2025-05-27
Payer: MEDICARE

## 2025-05-27 VITALS
RESPIRATION RATE: 17 BRPM | WEIGHT: 148.8 LBS | BODY MASS INDEX: 23.35 KG/M2 | TEMPERATURE: 98.8 F | SYSTOLIC BLOOD PRESSURE: 134 MMHG | HEIGHT: 67 IN | DIASTOLIC BLOOD PRESSURE: 63 MMHG | OXYGEN SATURATION: 99 % | HEART RATE: 60 BPM

## 2025-05-27 DIAGNOSIS — I25.10 CORONARY ARTERY DISEASE INVOLVING NATIVE CORONARY ARTERY OF NATIVE HEART WITHOUT ANGINA PECTORIS: ICD-10-CM

## 2025-05-27 DIAGNOSIS — J30.89 ENVIRONMENTAL AND SEASONAL ALLERGIES: ICD-10-CM

## 2025-05-27 DIAGNOSIS — Z87.891 FORMER SMOKER: ICD-10-CM

## 2025-05-27 DIAGNOSIS — K59.09 CHRONIC CONSTIPATION: ICD-10-CM

## 2025-05-27 DIAGNOSIS — Z71.89 ACP (ADVANCE CARE PLANNING): ICD-10-CM

## 2025-05-27 DIAGNOSIS — N52.9 ERECTILE DYSFUNCTION, UNSPECIFIED ERECTILE DYSFUNCTION TYPE: ICD-10-CM

## 2025-05-27 DIAGNOSIS — R41.3 MEMORY DEFICIT: ICD-10-CM

## 2025-05-27 DIAGNOSIS — R00.1 BRADYCARDIA: ICD-10-CM

## 2025-05-27 DIAGNOSIS — H90.3 SENSORINEURAL HEARING LOSS, BILATERAL: ICD-10-CM

## 2025-05-27 DIAGNOSIS — Z00.00 MEDICARE ANNUAL WELLNESS VISIT, SUBSEQUENT: Primary | ICD-10-CM

## 2025-05-27 DIAGNOSIS — G47.33 OBSTRUCTIVE SLEEP APNEA (ADULT) (PEDIATRIC): ICD-10-CM

## 2025-05-27 DIAGNOSIS — M70.52 BURSITIS OF LEFT KNEE, UNSPECIFIED BURSA: ICD-10-CM

## 2025-05-27 DIAGNOSIS — I10 PRIMARY HYPERTENSION: ICD-10-CM

## 2025-05-27 DIAGNOSIS — E78.2 MIXED HYPERLIPIDEMIA: ICD-10-CM

## 2025-05-27 DIAGNOSIS — H40.1120 PRIMARY OPEN ANGLE GLAUCOMA OF LEFT EYE, UNSPECIFIED GLAUCOMA STAGE: ICD-10-CM

## 2025-05-27 DIAGNOSIS — E11.21 TYPE 2 DIABETES MELLITUS WITH DIABETIC NEPHROPATHY, WITHOUT LONG-TERM CURRENT USE OF INSULIN (HCC): ICD-10-CM

## 2025-05-27 DIAGNOSIS — K21.9 GASTROESOPHAGEAL REFLUX DISEASE WITHOUT ESOPHAGITIS: ICD-10-CM

## 2025-05-27 DIAGNOSIS — H25.9 SENILE CATARACT OF LEFT EYE, UNSPECIFIED AGE-RELATED CATARACT TYPE: ICD-10-CM

## 2025-05-27 DIAGNOSIS — I51.89 GRADE I DIASTOLIC DYSFUNCTION: ICD-10-CM

## 2025-05-27 DIAGNOSIS — H25.092 AGE-RELATED INCIPIENT CATARACT OF LEFT EYE: ICD-10-CM

## 2025-05-27 PROBLEM — R20.0 NUMBNESS AND TINGLING OF BOTH UPPER EXTREMITIES: Status: RESOLVED | Noted: 2023-05-02 | Resolved: 2025-05-27

## 2025-05-27 PROBLEM — K29.00 ACUTE SUPERFICIAL GASTRITIS WITHOUT HEMORRHAGE: Status: RESOLVED | Noted: 2025-01-02 | Resolved: 2025-05-27

## 2025-05-27 PROBLEM — H26.9 CATARACT OF LEFT EYE: Status: RESOLVED | Noted: 2023-05-02 | Resolved: 2025-05-27

## 2025-05-27 PROBLEM — E77.8 HYPOPROTEINEMIA: Status: RESOLVED | Noted: 2024-05-02 | Resolved: 2025-05-27

## 2025-05-27 PROBLEM — R06.02 SHORTNESS OF BREATH: Status: RESOLVED | Noted: 2024-12-10 | Resolved: 2025-05-27

## 2025-05-27 PROBLEM — R20.2 NUMBNESS AND TINGLING OF BOTH UPPER EXTREMITIES: Status: RESOLVED | Noted: 2023-05-02 | Resolved: 2025-05-27

## 2025-05-27 LAB — SENTARA SPECIMEN COLLECTION: NORMAL

## 2025-05-27 PROCEDURE — 3075F SYST BP GE 130 - 139MM HG: CPT | Performed by: NURSE PRACTITIONER

## 2025-05-27 PROCEDURE — 99214 OFFICE O/P EST MOD 30 MIN: CPT | Performed by: NURSE PRACTITIONER

## 2025-05-27 PROCEDURE — 3078F DIAST BP <80 MM HG: CPT | Performed by: NURSE PRACTITIONER

## 2025-05-27 PROCEDURE — 99497 ADVNCD CARE PLAN 30 MIN: CPT | Performed by: NURSE PRACTITIONER

## 2025-05-27 PROCEDURE — G0439 PPPS, SUBSEQ VISIT: HCPCS | Performed by: NURSE PRACTITIONER

## 2025-05-27 PROCEDURE — 3044F HG A1C LEVEL LT 7.0%: CPT | Performed by: NURSE PRACTITIONER

## 2025-05-27 PROCEDURE — 1126F AMNT PAIN NOTED NONE PRSNT: CPT | Performed by: NURSE PRACTITIONER

## 2025-05-27 PROCEDURE — 1123F ACP DISCUSS/DSCN MKR DOCD: CPT | Performed by: NURSE PRACTITIONER

## 2025-05-27 ASSESSMENT — PATIENT HEALTH QUESTIONNAIRE - PHQ9
SUM OF ALL RESPONSES TO PHQ QUESTIONS 1-9: 0
2. FEELING DOWN, DEPRESSED OR HOPELESS: NOT AT ALL
SUM OF ALL RESPONSES TO PHQ QUESTIONS 1-9: 0
1. LITTLE INTEREST OR PLEASURE IN DOING THINGS: NOT AT ALL

## 2025-05-27 NOTE — ACP (ADVANCE CARE PLANNING)
Advance Care Planning     Advance Care Planning (ACP) Physician/NP/PA Conversation    Date of Conversation: 5/27/2025  Conducted with: Patient with Decision Making Capacity    Healthcare Decision Maker:      Primary Decision Maker: Milady Wyatt - Spouse - [phone]    Click here to complete Healthcare Decision Makers including selection of the Healthcare Decision Maker Relationship (ie \"Primary\")  Today we documented Decision Maker(s) consistent with Legal Next of Kin hierarchy.    Care Preferences:    Hospitalization:  \"If your health worsens and it becomes clear that your chance of recovery is unlikely, what would be your preference regarding hospitalization?\"  The patient would prefer comfort-focused treatment without hospitalization.    Ventilation:  \"If you were unable to breath on your own and your chance of recovery was unlikely, what would be your preference about the use of a ventilator (breathing machine) if it was available to you?\"  The patient would NOT desire the use of a ventilator.    Resuscitation:  \"In the event your heart stopped as a result of an underlying serious health condition, would you want attempts made to restart your heart, or would you prefer a natural death?\"  Yes, attempt to resuscitate.    ventilation preferences, hospitalization preferences, and resuscitation preferences    Conversation Outcomes / Follow-Up Plan:  ACP in process - completing/providing documents  Reviewed DNR/DNI and patient elects Full Code (Attempt Resuscitation)    Length of Voluntary ACP Conversation in minutes:  16 minutes    Huong Palomino DNP

## 2025-05-27 NOTE — PROGRESS NOTES
Medicare Annual Wellness Visit    Katlyn Henry is here for Medicare AWV    Assessment & Plan   Medicare annual wellness visit, subsequent  Assessment & Plan:  [facility] Wellness: Reviewed all HM and ordered tests/imaging appropriately. Reviewed care teams and medications with updates.     Reviewed and updated Healthcare Decision maker    Health maintenance.  - Annual wellness exam conducted.  - Discussed medication adherence and lifestyle modifications.  - Reviewed recent lab results and overall health status.  - Encouraged to continue regular follow-ups and preventive care measures.  - Encouraged patient to obtain vaccinations as discussed and request pharmacy to send notification to this office in order to update chart. (A list was provided if appropriate)   - Encouraged to complete screenings as discussed.  - Weight has increased from 144 to 148 pounds since the last visit, with a BMI within the normal range.  - Due for RSV and tetanus vaccines, which can be administered at his local pharmacy or [facility]. Influenza vaccine should be postponed until October 2025. Shingles vaccine is not necessary as he has never contracted chickenpox.  - Encouraged to maintain an active lifestyle to stimulate appetite and engage in cognitive activities such as word searches to enhance memory retention.    Specialists:  Dr Rico, cardio  [facility] specialists and PCP  ACP (advance care planning)  Assessment & Plan:  Advanced Care Planning: Patient educated on the importance of an advanced care plan. If ACP has already been completed, request pt to make available to this office.      elects Full Code (Attempt Resuscitation)    Length of Voluntary ACP Conversation in minutes:  16 minutes   Mixed hyperlipidemia  Assessment & Plan:  - LDL cholesterol has increased from 75 to 128, likely due to inconsistent medication adherence.  - Advised to take cholesterol medication daily as prescribed by Dr. Rico, who increased the Crestor dose from

## 2025-05-27 NOTE — PROGRESS NOTES
Katlyn Henry presents today for   Chief Complaint   Patient presents with    Medicare AWV           \"Have you been to the ER, urgent care clinic since your last visit?  Hospitalized since your last visit?\"    NO    “Have you seen or consulted any other health care providers outside of [facility] since your last visit?”    YES - When: approximately 2  weeks ago.  [facility] Why[facility]           +

## 2025-05-27 NOTE — ASSESSMENT & PLAN NOTE
Monitored by specialist- no acute findings meriting change in the plan  Continue eyedrops as prescribed  [facility] specialist  
 Monitored by specialist- no acute findings meriting change in the plan  [facility] specialist  Bilateral hearing aids  
 Well-controlled, continue current medications   Continue Zyrtec as prescribed by [facility]  provider  Avoid allergens (strong smells, animals, cigarettes or carpeted areas)  Avoid going outside when the pollen is heavy    1/2/2025:   Well-controlled, continue current medications   Continue Flonase as needed as prescribed by [facility]  provider  Avoid allergens (strong smells, animals, cigarettes or carpeted areas)  Avoid going outside when the pollen is heavy     
 Well-controlled, continue current medications   Viagra refills obtained via [facility]  
- A1c 7.2-6.7  - Janumet was initiated 11/26/2024.  Patient is not taking this medication  - Continue metformin 1000 mg twice daily as prescribed by the [facility]  - Today patient states the [facility] is managing his diabetes but November 2024 he stated that the [facility] was not managing his diabetes.    11/26/2024:  A1c 6.4-7.2  DC Metformin 1000mg BID  Initiate Janumet 50/1000mg bid  Sonal level 6m    According to patient the [facility] is no longer managing his diabetes.    5/2/2024 ov note:  [facility] has been managing DM  A1c 6.2-6.4  Microalbumin 27.4  Increase metformin from 1000 mg daily to twice daily  Patient has requested that I take over care for diabetes  Follow-up 6 months with A1c    
- Cataracts present in the left eye.  - No changes in vision reported; no new treatment required at this time.  - [facility] optometrist Will continue to monitor for any progression of symptoms.  - Cont eye drops as prescribed    5/2/2023:   Monitored by specialist- no acute findings meriting change in the plan   Cont gtts as prescribed by specialist  
- Cataracts present in the left eye.  - No changes in vision reported; no new treatment required at this time.  - [facility] optometrist Will continue to monitor for any progression of symptoms.  - Cont eye drops as prescribed    5/2/2023:   Monitored by specialist- no acute findings meriting change in the plan   Cont gtts as prescribed by specialist    
- Continue aspirin 81 mg daily  - Continue statin therapy as prescribed (Crestor 40 mg daily)  - Be active    1/2/2025:  He has a history of smoking and plaque buildup in his arteries. He is advised to reduce the intake of fatty and fried foods and maintain a diet rich in baked and boiled foods. Regular exercise, such as walking, is recommended to promote cardiovascular health. He should continue his daily aspirin regimen and his current dose of rosuvastatin (Crestor) 20 mg.    PROCEDURE  Cardiac catheterization revealed mild arterial thickening but overall good flow.  
- Has CPAP available   - Not using device  - I untreated LILIANE could cause memory deficits, blood pressure instability, fatigue, etc.  - Strongly recommend patient follow[facility]up with the [facility]     11/26/2024:  Has CPAP available   Not using device  Pt to f/u with the [facility]  
- LDL cholesterol has increased from 75 to 128, likely due to inconsistent medication adherence.  - Advised to take cholesterol medication daily as prescribed by Dr. Rico, who increased the Crestor dose from 20 mg to 40 mg on 01/09/2025.  - Cholesterol levels will be monitored with a lipid profile during the next annual wellness visit.  - Counseling provided on the importance of medication adherence for cholesterol management.  - Recheck lipid level 12 months    11/26/2024:  TG 66; -75 (in May 2024-increased Crestor from 10 mg to 20 mg)  Cont crestor 20mg qd  Sonal 6m    5/2/2024 ov note:  TG 92; LDL   Increase rosuvastatin from 10 mg (one half tab of 20 mg) to 20 mg daily  Recheck level 6 months  Patient has requested that I take over managing his cholesterol  
- Previously prescribed omeprazole in December 2024 for reflux.  - Reports no current symptoms and is not taking any medication for reflux.  - No further treatment required at this time.  
According to patient, [facility] is managing his blood pressure medicine  - Patient is no longer taking amlodipine 5 mg daily  - Patient continues hydrochlorothiazide 12.5 mg in the a.m. if his systolic is greater than 165  - Patient continues lisinopril 40 mg nightly  - Patient to follow-up with [facility]    11/26/2024:  Chronic, at goal (stable), continue current treatment plan   Well-controlled, continue current medications   Managed under [facility]    Continue amlodipine 5 mg daily  Continue HCTZ 25 mg daily  Continue lisinopril 40 mg daily  
Advanced Care Planning: Patient educated on the importance of an advanced care plan. If ACP has already been completed, request pt to make available to this office.      elects Full Code (Attempt Resuscitation)    Length of Voluntary ACP Conversation in minutes:  16 minutes   
Continue docusate as needed  Increase water and fiber in diet  Stay well-hydrated    11/26/2024:  Initiate MiraLAX daily as needed  Stop MiraLAX if diarrhea noted  Increase water and fiber in diet  
[facility] Wellness: Reviewed all HM and ordered tests/imaging appropriately. Reviewed care teams and medications with updates.     Reviewed and updated Healthcare Decision maker    Health maintenance.  - Annual wellness exam conducted.  - Discussed medication adherence and lifestyle modifications.  - Reviewed recent lab results and overall health status.  - Encouraged to continue regular follow-ups and preventive care measures.  - Encouraged patient to obtain vaccinations as discussed and request pharmacy to send notification to this office in order to update chart. (A list was provided if appropriate)   - Encouraged to complete screenings as discussed.  - Weight has increased from 144 to 148 pounds since the last visit, with a BMI within the normal range.  - Due for RSV and tetanus vaccines, which can be administered at his local pharmacy or through the [facility]. Influenza vaccine should be postponed until October 2025. Shingles vaccine is not necessary as he has never contracted chickenpox.  - Encouraged to maintain an active lifestyle to stimulate appetite and engage in cognitive activities such as word searches to enhance memory retention.    Specialists:  Dr Rico, cardio  [facility] specialists and PCP  
Established care with Dr. Rico, cardiology, January 2025.    1/2/2025:  He was diagnosed with bradycardia during his hospital stay. A referral to a cardiologist will be made for further evaluation and management. He should follow up with Dr Bennett, cardiologist who performed his heart catheterization at least once a year.  
No longer requires albuterol    1/2/2025:  Utilizes albuterol prn for occasional SOB  Refill provided  
Patient did not follow-up with neuropsych    11/26/2024:  Patient scored an 18 on his Mini-Mental  Referral to neuropsychiatrist placed  
Patient is asymptomatic today    1/2/2025:  Echocardiogram shows grade 1 diastolic dysfunction.     New Dx.  This condition is not uncommon for his age of 79 years. It does not require treatment unless it becomes symptomatic, which would indicate a progression of the disease. He should monitor for symptoms such as chest pain, ankle swelling, and severe shortness of breath.  
[facility]  Continue Lodine as prescribed  
yes

## 2025-05-27 NOTE — PATIENT INSTRUCTIONS
Learning About Managing Acute Pain at Home  What is a pain management plan?     A pain management plan spells out ways you can deal with your pain at home. You and your care team will create this plan before you leave the hospital. The plan may include:  The goals of your treatment. This may include how you can expect your pain and function to improve.  The treatments your doctor suggests for your pain. These may include medicines, physical therapy, or relaxation exercises.  Notes about how you and your care team will work together as you recover. Your team may include your doctor, a physical therapist, and an occupational therapist.  A review of your treatment goals for pain and function.  Your feelings about how you want to manage your pain are important. Be open and honest when you talk with your doctor. This will help ensure that you get a plan that is safe and that works best for you.  Why is it important to follow your plan?  After you have an injury or surgery, a certain amount of pain is common and normal. But you can manage your pain after you leave the hospital.  The best way to do that is to follow your pain management plan. This will help keep you comfortable and able to do the things you want to do. It can also speed your recovery and help reduce the risk of problems.  What are the side effects of pain medicines?  All pain medicines--like acetaminophen, nonsteroidal anti-inflammatory drugs, and opioids--have side effects, including allergic reaction, rash, and upset stomach. Common side effects of opioids also include constipation and being sleepy. More serious effects include needing larger doses over time, getting sick if you stop taking the drug, developing opioid use disorder, and death.  How do you manage pain after you leave the hospital?  After you leave the hospital, the best way to benefit from your treatment is to take good care of yourself. Here are some ways to do that.  Try nonmedical

## 2025-05-27 NOTE — PROGRESS NOTES
[facility] [id number] [facility]  [address]  [place], [place] [address]  [phone] office/[phone] fax    5/27/2025    Katlyn Henry 1945 is a pleasant Black /  male.       History of Present Illness  The patient presents for an annual wellness visit.    He is [facility], with [facility].     He has been utilizing hearing aids for the past 10 years.     His appetite has improved after a period of reduced food intake, during which his weight decreased from 158 to 140 pounds.     He reports no cognitive activities to maintain his mental health.     He has never had chickenpox. He did not receive the COVID-19 vaccine last year.    He is on a regimen of aspirin, Zyrtec for allergies, Colace as needed for constipation, Lodine for severe body aches, lisinopril 40 mg nightly, hydrochlorothiazide 25 mg in the morning if his blood pressure exceeds 165, metformin 1000 mg twice daily, Viagra, and eye drops.     He is under the care of Dr. Rico, a cardiologist, whom he consults annually.     [facility] is [facility] of the [facility] for [facility].    He was prescribed omeprazole in 12/2024 for reflux but reports no current issues and is not taking any medication for it.    He was prescribed an albuterol inhaler in 12/2024 for shortness of breath but reports no current issues and is not using the inhaler.    He has cataracts in his left eye.      Physical Exam      Physical Exam  Constitutional:       Appearance: Normal appearance.   HENT:      Head: Normocephalic and atraumatic.      Comments: Bilateral hearing aids     Right Ear: Tympanic membrane, ear canal and external ear normal.      Left Ear: Tympanic membrane, ear canal and external ear normal.      Nose: Nose normal.      Mouth/Throat:      Mouth: Mucous membranes are moist.   Eyes:      Extraocular Movements: Extraocular movements intact.

## 2025-06-26 PROBLEM — Z00.00 MEDICARE ANNUAL WELLNESS VISIT, SUBSEQUENT: Status: RESOLVED | Noted: 2024-05-02 | Resolved: 2025-06-26

## 2025-07-10 ENCOUNTER — OFFICE VISIT (OUTPATIENT)
Age: 80
End: 2025-07-10
Payer: MEDICARE

## 2025-07-10 VITALS
DIASTOLIC BLOOD PRESSURE: 79 MMHG | HEART RATE: 69 BPM | HEIGHT: 67 IN | OXYGEN SATURATION: 95 % | SYSTOLIC BLOOD PRESSURE: 133 MMHG | BODY MASS INDEX: 23.39 KG/M2 | WEIGHT: 149 LBS

## 2025-07-10 DIAGNOSIS — I25.10 CORONARY ARTERY DISEASE INVOLVING NATIVE CORONARY ARTERY OF NATIVE HEART WITHOUT ANGINA PECTORIS: ICD-10-CM

## 2025-07-10 DIAGNOSIS — I50.32 CHRONIC DIASTOLIC CONGESTIVE HEART FAILURE (HCC): Primary | ICD-10-CM

## 2025-07-10 DIAGNOSIS — E11.9 TYPE 2 DIABETES MELLITUS WITHOUT COMPLICATION, WITHOUT LONG-TERM CURRENT USE OF INSULIN (HCC): ICD-10-CM

## 2025-07-10 DIAGNOSIS — G47.33 OBSTRUCTIVE SLEEP APNEA (ADULT) (PEDIATRIC): ICD-10-CM

## 2025-07-10 DIAGNOSIS — E78.2 MIXED HYPERLIPIDEMIA: ICD-10-CM

## 2025-07-10 DIAGNOSIS — I10 PRIMARY HYPERTENSION: ICD-10-CM

## 2025-07-10 PROCEDURE — 1123F ACP DISCUSS/DSCN MKR DOCD: CPT | Performed by: INTERNAL MEDICINE

## 2025-07-10 PROCEDURE — 1159F MED LIST DOCD IN RCRD: CPT | Performed by: INTERNAL MEDICINE

## 2025-07-10 PROCEDURE — 3078F DIAST BP <80 MM HG: CPT | Performed by: INTERNAL MEDICINE

## 2025-07-10 PROCEDURE — 99214 OFFICE O/P EST MOD 30 MIN: CPT | Performed by: INTERNAL MEDICINE

## 2025-07-10 PROCEDURE — 3044F HG A1C LEVEL LT 7.0%: CPT | Performed by: INTERNAL MEDICINE

## 2025-07-10 PROCEDURE — 1160F RVW MEDS BY RX/DR IN RCRD: CPT | Performed by: INTERNAL MEDICINE

## 2025-07-10 PROCEDURE — 3075F SYST BP GE 130 - 139MM HG: CPT | Performed by: INTERNAL MEDICINE

## 2025-07-10 RX ORDER — OMEPRAZOLE 40 MG/1
40 CAPSULE, DELAYED RELEASE ORAL DAILY
COMMUNITY

## 2025-07-10 RX ORDER — ROSUVASTATIN CALCIUM 40 MG/1
40 TABLET, COATED ORAL DAILY
Qty: 90 TABLET | Refills: 3 | Status: SHIPPED | OUTPATIENT
Start: 2025-07-10

## 2025-07-10 ASSESSMENT — ENCOUNTER SYMPTOMS
SHORTNESS OF BREATH: 1
GASTROINTESTINAL NEGATIVE: 1
EYES NEGATIVE: 1

## 2025-07-10 NOTE — PROGRESS NOTES
1. Have you been to the ER, urgent care clinic since your last visit?  Hospitalized since your last visit?     no      2.  Where do you normally have your labs drawn?       3. Do you need any refills today?   Yes     4. Which local pharmacy do you use (enter pharmacy)?   [facility]     5. Which mail order pharmacy do you use (enter pharmacy)?        6. Are you here for surgical clearance and if so who will be doing your     procedure/surgery (care team)?         
without angina pectoris    Obstructive sleep apnea (adult) (pediatric)    Mixed hyperlipidemia  -     rosuvastatin (CRESTOR) 40 MG tablet; Take 1 tablet by mouth daily For cholesterol  -     Hepatic Function Panel; Future  -     Lipid Panel; Future    Type 2 diabetes mellitus without complication, without long-term current use of insulin ([facility])          See patient instructions also for other medical advice given    Medications Discontinued During This Encounter   Medication Reason    rosuvastatin (CRESTOR) 40 MG tablet REORDER       Follow-up and Dispositions    Return in about 6 months (around 1/10/2026), or if symptoms worsen or fail to improve, for post test/procedure.           Return to ER if any significant CP not relieved by s/l NTG, severe SOB, severe palpitations, loss of consciousness    7/10/2025  Plan for medicines : Continue current cardiac medications.  Represcribed and discussed the importance of compliance with medications.  Recheck lipids and LFTs when he takes his statins regularly.  Exercise Plan: Continue regular exercises but make it little faster so you can get tired and sweaty.  Diet plan: Mediterranean diet guidelines explained and printed.

## (undated) DEVICE — PADS  DEFIB  ADULT

## (undated) DEVICE — GLIDESHEATH SLENDER STAINLESS STEEL KIT: Brand: GLIDESHEATH SLENDER

## (undated) DEVICE — CATHETER DIAG 5FR L100CM LUMN ID0.047IN JL3.5 CRV 0 SIDE H

## (undated) DEVICE — CATHETER ANGIO 5FR L100CM GRY S STL NYL JR4 3 SEG BRAID L

## (undated) DEVICE — BAND COMPR L24CM REG CLR PLAS HEMSTAT EXT HK AND LOOP RETEN

## (undated) DEVICE — SET FLD ADMIN 3 W STPCOCK FIX FEM L BOR 1IN

## (undated) DEVICE — GAUZE,SPONGE,4"X4",16PLY,STRL,LF,10/TRAY: Brand: MEDLINE

## (undated) DEVICE — GUIDEWIRE VASC L260CM DIA0.035IN RAD 3MM J TIP L7CM PTFE

## (undated) DEVICE — ANGIOGRAPHY KIT CUST VASC

## (undated) DEVICE — PRESSURE MONITORING SET: Brand: TRUWAVE

## (undated) DEVICE — BAG WST COLL CLR DISP PVC W/ ROTICULATING LUER L BOR TBNG

## (undated) DEVICE — DECANTER BAG 9": Brand: MEDLINE INDUSTRIES, INC.

## (undated) DEVICE — PROCEDURE KIT FLUID MGMT 10 FR CUST MAINFOLD

## (undated) DEVICE — SUPPORT WRST COMPR W/ HK MBRACE

## (undated) DEVICE — Device

## (undated) DEVICE — COVER US PRB W15XL120CM W/ GEL RUBBERBAND TAPE STRP FLD GEN

## (undated) DEVICE — TOWEL,OR,DSP,ST,BLUE,STD,4/PK,20PK/CS: Brand: MEDLINE